# Patient Record
Sex: FEMALE | Race: BLACK OR AFRICAN AMERICAN | NOT HISPANIC OR LATINO | Employment: UNEMPLOYED | URBAN - METROPOLITAN AREA
[De-identification: names, ages, dates, MRNs, and addresses within clinical notes are randomized per-mention and may not be internally consistent; named-entity substitution may affect disease eponyms.]

---

## 2019-01-16 ENCOUNTER — OFFICE VISIT (OUTPATIENT)
Dept: FAMILY MEDICINE | Facility: CLINIC | Age: 34
End: 2019-01-16
Payer: MEDICAID

## 2019-01-16 VITALS
OXYGEN SATURATION: 100 % | HEART RATE: 124 BPM | DIASTOLIC BLOOD PRESSURE: 77 MMHG | TEMPERATURE: 98.9 F | SYSTOLIC BLOOD PRESSURE: 108 MMHG | WEIGHT: 161 LBS

## 2019-01-16 DIAGNOSIS — O99.011 ANEMIA DURING PREGNANCY IN FIRST TRIMESTER: ICD-10-CM

## 2019-01-16 DIAGNOSIS — R30.0 DYSURIA: Primary | ICD-10-CM

## 2019-01-16 DIAGNOSIS — R81 GLUCOSE FOUND IN URINE ON EXAMINATION: ICD-10-CM

## 2019-01-16 DIAGNOSIS — Z32.01 PREGNANCY TEST POSITIVE: ICD-10-CM

## 2019-01-16 LAB
ALBUMIN UR-MCNC: ABNORMAL MG/DL
APPEARANCE UR: CLEAR
BACTERIA #/AREA URNS HPF: ABNORMAL /HPF
BETA HCG QUAL IFA URINE: POSITIVE
BILIRUB UR QL STRIP: ABNORMAL
COLOR UR AUTO: YELLOW
ERYTHROCYTE [DISTWIDTH] IN BLOOD BY AUTOMATED COUNT: 13.5 % (ref 10–15)
GLUCOSE UR STRIP-MCNC: 100 MG/DL
HBA1C MFR BLD: 5 % (ref 0–5.6)
HCT VFR BLD AUTO: 31.5 % (ref 35–47)
HGB BLD-MCNC: 10.6 G/DL (ref 11.7–15.7)
HGB UR QL STRIP: NEGATIVE
KETONES UR STRIP-MCNC: 15 MG/DL
LEUKOCYTE ESTERASE UR QL STRIP: NEGATIVE
MCH RBC QN AUTO: 29.4 PG (ref 26.5–33)
MCHC RBC AUTO-ENTMCNC: 33.7 G/DL (ref 31.5–36.5)
MCV RBC AUTO: 88 FL (ref 78–100)
MUCOUS THREADS #/AREA URNS LPF: PRESENT /LPF
NITRATE UR QL: NEGATIVE
NON-SQ EPI CELLS #/AREA URNS LPF: ABNORMAL /LPF
PH UR STRIP: 5.5 PH (ref 5–7)
PLATELET # BLD AUTO: 152 10E9/L (ref 150–450)
RBC # BLD AUTO: 3.6 10E12/L (ref 3.8–5.2)
RBC #/AREA URNS AUTO: ABNORMAL /HPF
SOURCE: ABNORMAL
SP GR UR STRIP: >1.03 (ref 1–1.03)
SPECIMEN SOURCE: NORMAL
UROBILINOGEN UR STRIP-ACNC: 0.2 EU/DL (ref 0.2–1)
WBC # BLD AUTO: 5.4 10E9/L (ref 4–11)
WBC #/AREA URNS AUTO: ABNORMAL /HPF
WET PREP SPEC: NORMAL

## 2019-01-16 PROCEDURE — 86900 BLOOD TYPING SEROLOGIC ABO: CPT | Performed by: FAMILY MEDICINE

## 2019-01-16 PROCEDURE — 87210 SMEAR WET MOUNT SALINE/INK: CPT | Performed by: FAMILY MEDICINE

## 2019-01-16 PROCEDURE — 86762 RUBELLA ANTIBODY: CPT | Performed by: FAMILY MEDICINE

## 2019-01-16 PROCEDURE — G0499 HEPB SCREEN HIGH RISK INDIV: HCPCS | Performed by: FAMILY MEDICINE

## 2019-01-16 PROCEDURE — 81001 URINALYSIS AUTO W/SCOPE: CPT | Performed by: FAMILY MEDICINE

## 2019-01-16 PROCEDURE — 87591 N.GONORRHOEAE DNA AMP PROB: CPT | Performed by: FAMILY MEDICINE

## 2019-01-16 PROCEDURE — 86850 RBC ANTIBODY SCREEN: CPT | Performed by: FAMILY MEDICINE

## 2019-01-16 PROCEDURE — T1013 SIGN LANG/ORAL INTERPRETER: HCPCS | Mod: U3 | Performed by: FAMILY MEDICINE

## 2019-01-16 PROCEDURE — 86901 BLOOD TYPING SEROLOGIC RH(D): CPT | Performed by: FAMILY MEDICINE

## 2019-01-16 PROCEDURE — 85027 COMPLETE CBC AUTOMATED: CPT | Performed by: FAMILY MEDICINE

## 2019-01-16 PROCEDURE — 87086 URINE CULTURE/COLONY COUNT: CPT | Performed by: FAMILY MEDICINE

## 2019-01-16 PROCEDURE — 87389 HIV-1 AG W/HIV-1&-2 AB AG IA: CPT | Performed by: FAMILY MEDICINE

## 2019-01-16 PROCEDURE — 87491 CHLMYD TRACH DNA AMP PROBE: CPT | Performed by: FAMILY MEDICINE

## 2019-01-16 PROCEDURE — 84703 CHORIONIC GONADOTROPIN ASSAY: CPT | Performed by: FAMILY MEDICINE

## 2019-01-16 PROCEDURE — 80048 BASIC METABOLIC PNL TOTAL CA: CPT | Performed by: FAMILY MEDICINE

## 2019-01-16 PROCEDURE — 83036 HEMOGLOBIN GLYCOSYLATED A1C: CPT | Performed by: FAMILY MEDICINE

## 2019-01-16 PROCEDURE — 36415 COLL VENOUS BLD VENIPUNCTURE: CPT | Performed by: FAMILY MEDICINE

## 2019-01-16 PROCEDURE — 99203 OFFICE O/P NEW LOW 30 MIN: CPT | Performed by: FAMILY MEDICINE

## 2019-01-16 RX ORDER — PNV NO.95/FERROUS FUM/FOLIC AC 28MG-0.8MG
1 TABLET ORAL DAILY
Qty: 100 TABLET | Refills: 3 | Status: ON HOLD | OUTPATIENT
Start: 2019-01-16 | End: 2019-05-30

## 2019-01-16 SDOH — HEALTH STABILITY: MENTAL HEALTH: HOW OFTEN DO YOU HAVE A DRINK CONTAINING ALCOHOL?: NEVER

## 2019-01-16 NOTE — PROGRESS NOTES
SUBJECTIVE:  Caleb Talley is a 34 year old female who presents pregnancy.   Her LMP was 9/15/18.   Her periods are about every 4 weeks.   She has never been pregnant before.   She is having some nausea and some vomiting.   She is not on any prenatal vitamins.       Obstetric History       T0      L0     SAB0   TAB0   Ectopic0   Multiple0   Live Births0       # Outcome Date GA Lbr Ben/2nd Weight Sex Delivery Anes PTL Lv   1 Current                     History reviewed. No pertinent past medical history.    Past Surgical History:   Procedure Laterality Date     NO HISTORY OF SURGERY         MEDICATIONS:  Current Outpatient Medications   Medication     Prenatal Vit-Fe Fumarate-FA (PRENATAL VITAMIN AND MINERAL) 28-0.8 MG TABS     No current facility-administered medications for this visit.        SOCIAL HISTORY:  Social History     Tobacco Use     Smoking status: Never Smoker     Smokeless tobacco: Never Used   Substance Use Topics     Alcohol use: No     Frequency: Never       History reviewed. No pertinent family history.    Objective:  Blood pressure 108/77, pulse 124, temperature 98.9  F (37.2  C), temperature source Oral, weight 73 kg (161 lb), last menstrual period 09/15/2018, SpO2 100 %.  Chest: Clear to auscultation bilaterally.  No wheezes, rales or retractions.  CV: Regular rate and rhythm without murmurs, rubs or gallops.  FH: 18 cm  FHR: 160 bpm    UPT: +++    Assessment:  1. Primigravida at 17 weeks and 3 days by LMP  2. Some nausea    Plan:  1. Will get first OB labs  2. Patient does need pap - will schedule for 2 weeks  3. Ordered ultrasound for dating and fetal survey  4. Will discuss quad screen at next visit  5. Ordered prenatal vitamins for daily intake

## 2019-01-17 ENCOUNTER — HOSPITAL ENCOUNTER (OUTPATIENT)
Dept: ULTRASOUND IMAGING | Facility: CLINIC | Age: 34
Discharge: HOME | End: 2019-01-17
Attending: FAMILY MEDICINE | Admitting: FAMILY MEDICINE
Payer: MEDICAID

## 2019-01-17 DIAGNOSIS — Z32.01 PREGNANCY TEST POSITIVE: ICD-10-CM

## 2019-01-17 LAB
ABO + RH BLD: NORMAL
ABO + RH BLD: NORMAL
ANION GAP SERPL CALCULATED.3IONS-SCNC: 8 MMOL/L (ref 3–14)
BACTERIA SPEC CULT: NORMAL
BLD GP AB SCN SERPL QL: NORMAL
BLOOD BANK CMNT PATIENT-IMP: NORMAL
BUN SERPL-MCNC: 5 MG/DL (ref 7–30)
C TRACH DNA SPEC QL NAA+PROBE: NEGATIVE
CALCIUM SERPL-MCNC: 9.1 MG/DL (ref 8.5–10.1)
CHLORIDE SERPL-SCNC: 105 MMOL/L (ref 94–109)
CO2 SERPL-SCNC: 23 MMOL/L (ref 20–32)
CREAT SERPL-MCNC: 0.46 MG/DL (ref 0.52–1.04)
GFR SERPL CREATININE-BSD FRML MDRD: >90 ML/MIN/{1.73_M2}
GLUCOSE SERPL-MCNC: 155 MG/DL (ref 70–99)
N GONORRHOEA DNA SPEC QL NAA+PROBE: NEGATIVE
POTASSIUM SERPL-SCNC: 3.6 MMOL/L (ref 3.4–5.3)
RUBV IGG SERPL IA-ACNC: 4 IU/ML
SODIUM SERPL-SCNC: 136 MMOL/L (ref 133–144)
SPECIMEN EXP DATE BLD: NORMAL
SPECIMEN SOURCE: NORMAL

## 2019-01-17 PROCEDURE — T1013 SIGN LANG/ORAL INTERPRETER: HCPCS | Mod: U3

## 2019-01-17 PROCEDURE — 76805 OB US >/= 14 WKS SNGL FETUS: CPT

## 2019-01-18 ENCOUNTER — TELEPHONE (OUTPATIENT)
Dept: FAMILY MEDICINE | Facility: CLINIC | Age: 34
End: 2019-01-18

## 2019-01-18 DIAGNOSIS — R73.09 ELEVATED GLUCOSE LEVEL: Primary | ICD-10-CM

## 2019-01-18 PROBLEM — Z28.39 RUBELLA NON-IMMUNE STATUS, ANTEPARTUM: Status: ACTIVE | Noted: 2019-01-18

## 2019-01-18 PROBLEM — O09.899 RUBELLA NON-IMMUNE STATUS, ANTEPARTUM: Status: ACTIVE | Noted: 2019-01-18

## 2019-01-18 PROBLEM — O99.011 ANEMIA DURING PREGNANCY IN FIRST TRIMESTER: Status: ACTIVE | Noted: 2019-01-18

## 2019-01-18 PROBLEM — Z67.10 BLOOD TYPE A+: Status: ACTIVE | Noted: 2019-01-18

## 2019-01-18 LAB
HBV SURFACE AG SERPL QL IA: NONREACTIVE
HIV 1+2 AB+HIV1 P24 AG SERPL QL IA: NONREACTIVE

## 2019-01-18 RX ORDER — FERROUS GLUCONATE 324(38)MG
324 TABLET ORAL 2 TIMES DAILY WITH MEALS
Qty: 100 TABLET | Refills: 6 | Status: SHIPPED | OUTPATIENT
Start: 2019-01-18 | End: 2019-03-20

## 2019-01-18 NOTE — TELEPHONE ENCOUNTER
Pt informed via   [id number].   Agrees to plan.   Fasting (drink water during your fast) 3 hr GTT 1/23/19.  Lab staff informed.   Joe Simpson RN

## 2019-01-18 NOTE — TELEPHONE ENCOUNTER
Mild anemia - sent iron to our pharmacy.   NOT immune to rubella.   Will need vaccination after delivery but in meantime avoid people who are sick and have not been vaccinated with the MMR vaccine. Also blood sugar a little high.   Will need to have diabetes screen.   Could do as lab only too.   I would prefer to 3 hour GTT.

## 2019-01-18 NOTE — TELEPHONE ENCOUNTER
Created episode.  Date in log book updated.  Not able to get to dating to enter U/S date and ANDREIA.  Raquel Park RN

## 2019-01-23 DIAGNOSIS — R73.09 ELEVATED GLUCOSE LEVEL: ICD-10-CM

## 2019-01-23 PROCEDURE — 36415 COLL VENOUS BLD VENIPUNCTURE: CPT | Performed by: FAMILY MEDICINE

## 2019-01-23 PROCEDURE — 82951 GLUCOSE TOLERANCE TEST (GTT): CPT | Performed by: FAMILY MEDICINE

## 2019-01-23 PROCEDURE — 82952 GTT-ADDED SAMPLES: CPT | Performed by: FAMILY MEDICINE

## 2019-01-24 LAB
GLUCOSE 1H P 100 G GLC PO SERPL-MCNC: 159 MG/DL (ref 60–179)
GLUCOSE 2H P 100 G GLC PO SERPL-MCNC: 100 MG/DL (ref 60–154)
GLUCOSE 3H P 100 G GLC PO SERPL-MCNC: 81 MG/DL (ref 60–139)
GLUCOSE P FAST SERPL-MCNC: 82 MG/DL (ref 60–94)

## 2019-02-06 ENCOUNTER — PRENATAL OFFICE VISIT (OUTPATIENT)
Dept: FAMILY MEDICINE | Facility: CLINIC | Age: 34
End: 2019-02-06
Payer: MEDICAID

## 2019-02-06 VITALS — TEMPERATURE: 98.1 F | HEART RATE: 137 BPM | WEIGHT: 163 LBS

## 2019-02-06 DIAGNOSIS — K21.9 GASTROESOPHAGEAL REFLUX DISEASE, ESOPHAGITIS PRESENCE NOT SPECIFIED: Primary | ICD-10-CM

## 2019-02-06 DIAGNOSIS — Z34.92 PRENATAL CARE IN SECOND TRIMESTER: ICD-10-CM

## 2019-02-06 PROCEDURE — G0145 SCR C/V CYTO,THINLAYER,RESCR: HCPCS | Performed by: FAMILY MEDICINE

## 2019-02-06 PROCEDURE — T1013 SIGN LANG/ORAL INTERPRETER: HCPCS | Mod: U3 | Performed by: FAMILY MEDICINE

## 2019-02-06 PROCEDURE — 99207 ZZC FIRST OB VISIT: CPT | Performed by: FAMILY MEDICINE

## 2019-02-06 PROCEDURE — 87624 HPV HI-RISK TYP POOLED RSLT: CPT | Performed by: FAMILY MEDICINE

## 2019-02-06 PROCEDURE — G0476 HPV COMBO ASSAY CA SCREEN: HCPCS | Performed by: FAMILY MEDICINE

## 2019-02-06 NOTE — LETTER
February 14, 2019    Caleb Talley  [id number] [address]    Dear ,  This letter is regarding your recent Pap smear (cervical cancer screening) and Human Papillomavirus (HPV) test.  We are happy to inform you that your Pap smear result is normal. Cervical cancer is closely linked with certain types of HPV. Your results showed no evidence of high-risk HPV.  Therefore we recommend you return in 5 years for your next pap smear and HPV test.  You will still need to return to the clinic every year for an annual exam and other preventive tests.  If you have additional questions regarding this result, please call our registered nurse, Zarina at [phone].  Sincerely,    Rin Hoffmann MD/[facility]

## 2019-02-06 NOTE — PROGRESS NOTES
Subjective:  Caleb Talley is a 34 year old female  who presents today for her first prenatal visit.  She has never had an abnormal PAP smear.  Her LMP was uncertain and mid september.  She has had trouble with nausea.  This is her 1st pregnancy.  She is a G 1 P 0.    Her EDC is 5/31/2019.  She has the following questions:  none.   She is feeling the baby move for the last 4 weeks.       Current Outpatient Medications   Medication Sig Dispense Refill     ferrous gluconate (FERGON) 324 (38 Fe) MG tablet Take 1 tablet (324 mg) by mouth 2 times daily (with meals) 100 tablet 6     Prenatal Vit-Fe Fumarate-FA (PRENATAL VITAMIN AND MINERAL) 28-0.8 MG TABS Take 1 tablet by mouth daily 100 tablet 3       Past Medical History:   Diagnosis Date     Blood type A+ 1/18/2019     Rubella non-immune status, antepartum 1/18/2019       Past Surgical History:   Procedure Laterality Date     NO HISTORY OF SURGERY         History reviewed. No pertinent family history.    Social History     Tobacco Use     Smoking status: Never Smoker     Smokeless tobacco: Never Used   Substance Use Topics     Alcohol use: No     Frequency: Never       Objective:  Blood pressure 114/78, pulse 137, temperature 98.1  F (36.7  C), temperature source Oral, weight 73.9 kg (163 lb), last menstrual period 09/15/2018.  General appearance: Healthy.  Mental Status: cooperative, normal affect, no gross thought process defects.  HEENT:   Ears- Normal external canals and TMs  Eyes- PERRL, EOM's intact, fundi benign, sharp disc margins.  Throat- Normal  Nodes- Negative  Thyroid: Normal to palpation, no enlargement or nodules noted.  Breasts: Symmetric without mass tenderness or discharge.  Axillary nodes negative.  Lungs: Clear to auscultation bilaterally  Heart.: Normal rate and rhythm.  No murmurs, clicks or gallops.  Pulses:    R-4/4, PT-4/4, DP-4/4  Abdomen: BS active. Soft, non-tender, no masses or organomegaly.  Aorta normal. No bruits.  Genitalia: Normal female  external genitalia without lesions.  Speculum:  Cervix normal,  Pap taken, bimanual exam  22 week size uterus, no adenexal mass or tenderness.  Doptone 140-150.  Extremities: Normal without edema  Reflexes:  Symmetric bilaterally and 2 + at the patella  Skin: Clear and free of rash.    Assessment:  1. Caleb Tallye is a healthy 34 year old female here for a first prenatal visit.      Plan:  1. A Pap smear was obtained  2. Prenatal labs ordered - see epicare orders  3. Patient is to follow-up in 4 weeks and as needed.

## 2019-02-08 LAB
COPATH REPORT: NORMAL
PAP: NORMAL

## 2019-02-12 LAB
FINAL DIAGNOSIS: NORMAL
HPV HR 12 DNA CVX QL NAA+PROBE: NEGATIVE
HPV16 DNA SPEC QL NAA+PROBE: NEGATIVE
HPV18 DNA SPEC QL NAA+PROBE: NEGATIVE
SPECIMEN DESCRIPTION: NORMAL
SPECIMEN SOURCE CVX/VAG CYTO: NORMAL

## 2019-03-06 ENCOUNTER — PRENATAL OFFICE VISIT (OUTPATIENT)
Dept: FAMILY MEDICINE | Facility: CLINIC | Age: 34
End: 2019-03-06
Payer: COMMERCIAL

## 2019-03-06 VITALS
HEART RATE: 130 BPM | SYSTOLIC BLOOD PRESSURE: 110 MMHG | DIASTOLIC BLOOD PRESSURE: 70 MMHG | OXYGEN SATURATION: 99 % | RESPIRATION RATE: 17 BRPM | TEMPERATURE: 97.8 F | WEIGHT: 162 LBS

## 2019-03-06 DIAGNOSIS — Z23 ENCOUNTER FOR IMMUNIZATION: ICD-10-CM

## 2019-03-06 DIAGNOSIS — Z34.92 PRENATAL CARE IN SECOND TRIMESTER: ICD-10-CM

## 2019-03-06 DIAGNOSIS — O21.9 NAUSEA AND VOMITING DURING PREGNANCY: Primary | ICD-10-CM

## 2019-03-06 PROCEDURE — 90471 IMMUNIZATION ADMIN: CPT | Performed by: FAMILY MEDICINE

## 2019-03-06 PROCEDURE — 99207 ZZC PRENATAL VISIT: CPT | Mod: 25 | Performed by: FAMILY MEDICINE

## 2019-03-06 PROCEDURE — 90715 TDAP VACCINE 7 YRS/> IM: CPT | Performed by: FAMILY MEDICINE

## 2019-03-06 RX ORDER — ONDANSETRON 4 MG/1
4 TABLET, FILM COATED ORAL EVERY 8 HOURS PRN
Qty: 30 TABLET | Refills: 1 | Status: SHIPPED | OUTPATIENT
Start: 2019-03-06 | End: 2022-07-26

## 2019-03-06 NOTE — NURSING NOTE
Screening Questionnaire for Adult Immunization    Are you sick today?   No   Do you have allergies to medications, food, a vaccine component or latex?   No   Have you ever had a serious reaction after receiving a vaccination?   No   Do you have a long-term health problem with heart disease, lung disease, asthma, kidney disease, metabolic disease (e.g. diabetes), anemia, or other blood disorder?   No   Do you have cancer, leukemia, HIV/AIDS, or any other immune system problem?   No   In the past 3 months, have you taken medications that affect  your immune system, such as prednisone, other steroids, or anticancer drugs; drugs for the treatment of rheumatoid arthritis, Crohn s disease, or psoriasis; or have you had radiation treatments?   No   Have you had a seizure, or a brain or other nervous system problem?   No   During the past year, have you received a transfusion of blood or blood     products, or been given immune (gamma) globulin or antiviral drug?   No   For women: Are you pregnant or is there a chance you could become        pregnant during the next month?   No   Have you received any vaccinations in the past 4 weeks?   No     Patient instructed to remain in clinic for 20 minutes afterwards, and to report any adverse reaction to me immediately.       Screening performed by aYdy Alcocer CMA    Prior to injection verified patient identity using patient's name and date of birth.  Per orders of Dr. Spear , injection of Tdap given by Yady Alcocer CMA. Patient instructed to remain in clinic for 20 minutes afterwards, and to report any adverse reaction to me immediately.    Vaccine information supplied.

## 2019-03-06 NOTE — PROGRESS NOTES
Still with nausea and vomiting almost every time she eats - will send over zofran for this  tdap today  Rubella NOT IMMUNE  Will check HGB at 36 weeks  Cervix check next visit  Feeling fetal movement  BOY baby

## 2019-03-11 ENCOUNTER — TELEPHONE (OUTPATIENT)
Dept: FAMILY MEDICINE | Facility: CLINIC | Age: 34
End: 2019-03-11

## 2019-03-11 NOTE — TELEPHONE ENCOUNTER
Agree with all advice plus could use OTC colace stool softener and miralax 1-2 tbsp per day.   If this is not working should come in

## 2019-03-11 NOTE — TELEPHONE ENCOUNTER
Pt calling into clinic  Pt did not understand some of the words I used, explained several times using different words for constipation, stool, fiber foods, etc  C/o constipation, thinks her last stool was about a week ago  Very uncomfortable, difficult to sleep, has urge to go but not able  advised high fiber foods, fresh fruits & vegetables, increased water, hot coffee and tea in am, stool softeners like Citrucel, Metamucil, mineral oil      # [phone]    Pt is pregnant, not sure what are best options for constipation while pregnant    Route to provider to review and advise    Griselda Mclaughlin RN Nurse Triage

## 2019-03-20 ENCOUNTER — PRENATAL OFFICE VISIT (OUTPATIENT)
Dept: FAMILY MEDICINE | Facility: CLINIC | Age: 34
End: 2019-03-20
Payer: COMMERCIAL

## 2019-03-20 VITALS
DIASTOLIC BLOOD PRESSURE: 66 MMHG | TEMPERATURE: 97.6 F | HEART RATE: 124 BPM | WEIGHT: 163.2 LBS | RESPIRATION RATE: 18 BRPM | OXYGEN SATURATION: 99 % | SYSTOLIC BLOOD PRESSURE: 102 MMHG | BODY MASS INDEX: 32.9 KG/M2 | HEIGHT: 59 IN

## 2019-03-20 DIAGNOSIS — O09.899 RUBELLA NON-IMMUNE STATUS, ANTEPARTUM: ICD-10-CM

## 2019-03-20 DIAGNOSIS — Z28.39 RUBELLA NON-IMMUNE STATUS, ANTEPARTUM: ICD-10-CM

## 2019-03-20 DIAGNOSIS — Z34.92 PRENATAL CARE IN SECOND TRIMESTER: Primary | ICD-10-CM

## 2019-03-20 DIAGNOSIS — O99.011 ANEMIA DURING PREGNANCY IN FIRST TRIMESTER: ICD-10-CM

## 2019-03-20 LAB — HGB BLD-MCNC: 10.5 G/DL (ref 11.7–15.7)

## 2019-03-20 PROCEDURE — 00000218 ZZHCL STATISTIC OBHBG - HEMOGLOBIN: Performed by: FAMILY MEDICINE

## 2019-03-20 PROCEDURE — 36415 COLL VENOUS BLD VENIPUNCTURE: CPT | Performed by: FAMILY MEDICINE

## 2019-03-20 PROCEDURE — 99207 ZZC PRENATAL VISIT: CPT | Performed by: FAMILY MEDICINE

## 2019-03-20 RX ORDER — FERROUS GLUCONATE 324(38)MG
324 TABLET ORAL
Qty: 180 TABLET | Refills: 1 | Status: ON HOLD | OUTPATIENT
Start: 2019-03-20 | End: 2019-05-30

## 2019-03-20 ASSESSMENT — MIFFLIN-ST. JEOR: SCORE: 1337.96

## 2019-03-20 NOTE — PROGRESS NOTES
She is doing well  She does not need the medication anymore for her nausea.     She is feeling the baby move.  OBHGB today   tdap done today   Will check cervix at next visit  Rubella NON immune - will vaccinate after delivery   BOY baby

## 2019-04-10 ENCOUNTER — PRENATAL OFFICE VISIT (OUTPATIENT)
Dept: FAMILY MEDICINE | Facility: CLINIC | Age: 34
End: 2019-04-10
Payer: COMMERCIAL

## 2019-04-10 VITALS
DIASTOLIC BLOOD PRESSURE: 74 MMHG | SYSTOLIC BLOOD PRESSURE: 112 MMHG | OXYGEN SATURATION: 97 % | RESPIRATION RATE: 18 BRPM | HEART RATE: 63 BPM | BODY MASS INDEX: 33.69 KG/M2 | WEIGHT: 164 LBS | TEMPERATURE: 98 F

## 2019-04-10 DIAGNOSIS — Z34.93 PRENATAL CARE IN THIRD TRIMESTER: ICD-10-CM

## 2019-04-10 PROCEDURE — 99207 ZZC PRENATAL VISIT: CPT | Performed by: FAMILY MEDICINE

## 2019-04-10 NOTE — PROGRESS NOTES
Rubella NON immune  Baby BOY  Doing well with no concerns  On iron for mild anemia of 10.5  Passed 3 hour GTT and had tdap

## 2019-05-13 ENCOUNTER — PRENATAL OFFICE VISIT (OUTPATIENT)
Dept: FAMILY MEDICINE | Facility: CLINIC | Age: 34
End: 2019-05-13
Payer: COMMERCIAL

## 2019-05-13 VITALS
BODY MASS INDEX: 34.1 KG/M2 | TEMPERATURE: 97.6 F | WEIGHT: 166 LBS | HEART RATE: 106 BPM | OXYGEN SATURATION: 100 % | DIASTOLIC BLOOD PRESSURE: 66 MMHG | SYSTOLIC BLOOD PRESSURE: 106 MMHG

## 2019-05-13 DIAGNOSIS — Z34.93 PRENATAL CARE IN THIRD TRIMESTER: Primary | ICD-10-CM

## 2019-05-13 LAB — HGB BLD-MCNC: 11.9 G/DL (ref 11.7–15.7)

## 2019-05-13 PROCEDURE — 36415 COLL VENOUS BLD VENIPUNCTURE: CPT | Performed by: FAMILY MEDICINE

## 2019-05-13 PROCEDURE — 00000218 ZZHCL STATISTIC OBHBG - HEMOGLOBIN: Performed by: FAMILY MEDICINE

## 2019-05-13 PROCEDURE — 87653 STREP B DNA AMP PROBE: CPT | Performed by: FAMILY MEDICINE

## 2019-05-13 PROCEDURE — 86780 TREPONEMA PALLIDUM: CPT | Performed by: FAMILY MEDICINE

## 2019-05-13 PROCEDURE — 99207 ZZC PRENATAL VISIT: CPT | Performed by: FAMILY MEDICINE

## 2019-05-13 NOTE — PROGRESS NOTES
Baby is moving  Some contractions  No concerns  Baby is cephalic  Collected GBS today  Cervix check difficult due to patient discomfort

## 2019-05-14 LAB
GP B STREP DNA SPEC QL NAA+PROBE: NEGATIVE
SPECIMEN SOURCE: NORMAL
T PALLIDUM AB SER QL: NONREACTIVE

## 2019-05-22 ENCOUNTER — PRENATAL OFFICE VISIT (OUTPATIENT)
Dept: FAMILY MEDICINE | Facility: CLINIC | Age: 34
End: 2019-05-22
Payer: COMMERCIAL

## 2019-05-22 VITALS
HEART RATE: 100 BPM | RESPIRATION RATE: 16 BRPM | WEIGHT: 166 LBS | DIASTOLIC BLOOD PRESSURE: 80 MMHG | BODY MASS INDEX: 34.1 KG/M2 | OXYGEN SATURATION: 100 % | TEMPERATURE: 98.3 F | SYSTOLIC BLOOD PRESSURE: 124 MMHG

## 2019-05-22 DIAGNOSIS — Z34.93 PRENATAL CARE IN THIRD TRIMESTER: Primary | ICD-10-CM

## 2019-05-22 PROCEDURE — 99207 ZZC PRENATAL VISIT: CPT | Performed by: FAMILY MEDICINE

## 2019-05-22 NOTE — PROGRESS NOTES
Some contractions  GBS NEG  HGB 11.9  BOY baby  Difficult to get good vaginal exam and cervical exam due to patients inability to cooperate due to discomfort - babies head is low and cervix feels soft and thinner than last week

## 2019-05-23 ENCOUNTER — TELEPHONE (OUTPATIENT)
Dept: FAMILY MEDICINE | Facility: CLINIC | Age: 34
End: 2019-05-23

## 2019-05-23 NOTE — TELEPHONE ENCOUNTER
Reason for call:  Reason for call:  Patient reporting a symptom    Symptom or request: 9mo pregnant and spotting.    Duration (how long have symptoms been present): last couple days    Have you been treated for this before? No    Additional comments:     Phone Number patient can be reached at:  Cell number on file:    Telephone Information:   Mobile [phone]       Best Time:      Can we leave a detailed message on this number:  YES    Call taken on 5/23/2019 at 8:09 AM by Salomón Flor

## 2019-05-23 NOTE — TELEPHONE ENCOUNTER
Called patient back.  Spotting this am.  A little bit on toilet paper.  Discussed spotting can be caused by exam by provider which she had yesterday.  Discussed she monitor should resolve in 2 days or so and to call if increased pain or increased bleeding.  Patient agrees with plan.  Raquel Park RN

## 2019-05-27 ENCOUNTER — HOSPITAL ENCOUNTER (OUTPATIENT)
Facility: CLINIC | Age: 34
Discharge: HOME | End: 2019-05-27
Attending: FAMILY MEDICINE | Admitting: FAMILY MEDICINE
Payer: COMMERCIAL

## 2019-05-27 ENCOUNTER — HOSPITAL ENCOUNTER (INPATIENT)
Facility: CLINIC | Age: 34
LOS: 2 days | Discharge: HOME | End: 2019-05-30
Attending: FAMILY MEDICINE | Admitting: OBSTETRICS & GYNECOLOGY
Payer: COMMERCIAL

## 2019-05-27 VITALS
DIASTOLIC BLOOD PRESSURE: 74 MMHG | SYSTOLIC BLOOD PRESSURE: 113 MMHG | TEMPERATURE: 98.1 F | HEART RATE: 140 BPM | RESPIRATION RATE: 18 BRPM | OXYGEN SATURATION: 99 %

## 2019-05-27 DIAGNOSIS — O99.011 ANEMIA DURING PREGNANCY IN FIRST TRIMESTER: ICD-10-CM

## 2019-05-27 LAB
ALBUMIN UR-MCNC: 70 MG/DL
APPEARANCE UR: ABNORMAL
BACTERIA #/AREA URNS HPF: ABNORMAL /HPF
BILIRUB UR QL STRIP: NEGATIVE
COLOR UR AUTO: YELLOW
GLUCOSE UR STRIP-MCNC: 30 MG/DL
HGB UR QL STRIP: ABNORMAL
KETONES UR STRIP-MCNC: 10 MG/DL
LEUKOCYTE ESTERASE UR QL STRIP: ABNORMAL
MUCOUS THREADS #/AREA URNS LPF: PRESENT /LPF
NITRATE UR QL: NEGATIVE
PH UR STRIP: 6 PH (ref 5–7)
RBC #/AREA URNS AUTO: 5 /HPF (ref 0–2)
SOURCE: ABNORMAL
SP GR UR STRIP: 1.02 (ref 1–1.03)
SQUAMOUS #/AREA URNS AUTO: 13 /HPF (ref 0–1)
TRANS CELLS #/AREA URNS HPF: 1 /HPF (ref 0–1)
UROBILINOGEN UR STRIP-MCNC: NORMAL MG/DL (ref 0–2)
WBC #/AREA URNS AUTO: 26 /HPF (ref 0–5)

## 2019-05-27 PROCEDURE — G0463 HOSPITAL OUTPT CLINIC VISIT: HCPCS

## 2019-05-27 PROCEDURE — 59025 FETAL NON-STRESS TEST: CPT | Mod: 26 | Performed by: OBSTETRICS & GYNECOLOGY

## 2019-05-27 PROCEDURE — 81001 URINALYSIS AUTO W/SCOPE: CPT | Performed by: FAMILY MEDICINE

## 2019-05-27 PROCEDURE — 59025 FETAL NON-STRESS TEST: CPT

## 2019-05-27 NOTE — PROVIDER NOTIFICATION
05/27/19 1045   Provider Notification   Provider Name/Title Dr RAMOS Spear    Method of Notification Phone   Request Evaluate - Remote   Notification Reason Patient Arrived;Membrane Status;Labor Status;Uterine Activity;Maternal Vital Sign Change;Status Update;SVE    Dr Spear updated with patients arrival, SVE of of 1.5/80/-1, contraction pattern, FHR, elevated maternal pulse. Orders received to walk the patient and recheck in a couple hours.

## 2019-05-27 NOTE — PROVIDER NOTIFICATION
05/27/19 1111   Provider Notification   Provider Name/Title Dr Spear    Method of Notification Phone   Request Evaluate - Remote   Notification Reason Status Update   Updated Dr Spear with patients request to go home and labor there until things get worse.  is ok with that plan.

## 2019-05-27 NOTE — DISCHARGE INSTRUCTIONS
Discharge Instruction for Undelivered Patients      You were seen for: Labor Assessment  We Consulted: Dr Spear  You had (Test or Medicine): non stress test     Diet:   Drink 8 to 12 glasses of liquids (milk, juice, water) every day.  You may eat meals and snacks.     Activity:  Call your doctor or nurse midwife if your baby is moving less than usual.     Call your provider if you notice:  Swelling in your face or increased swelling in your hands or legs.  Headaches that are not relieved by Tylenol (acetaminophen).  Changes in your vision (blurring: seeing spots or stars.)  Nausea (sick to your stomach) and vomiting (throwing up).   Weight gain of 5 pounds or more per week.  Heartburn that doesn't go away.    Signs of bladder infection: pain when you urinate (use the toilet), need to go more often and more urgently.  The bag of jones (rupture of membranes) breaks, or you notice leaking in your underwear.  Bright red blood in your underwear.  Abdominal (lower belly) or stomach pain.  For first baby: Contractions (tightening) less than 5 minutes apart for one hour or more.    Increase or change in vaginal discharge (note the color and amount)  Other: Call and come back if the contractions get worse.     Follow-up:  As scheduled in the clinic

## 2019-05-27 NOTE — PLAN OF CARE
Data: Patient presented to [facility] at 2019  9:49 AM.  Reason for maternal/fetal assessment is uterine contractions, vaginal bleeding, abdominal pain. Patient reports contractions and a little bloody show .  Patient is a .  Prenatal record reviewed. Pregnancy has been has been uncomplicated. thus far.  Gestational Age 39w3d. VSS. Fetal movement present. Patient denies leaking of vaginal fluid/rupture of membranes, pelvic pressure, nausea, vomiting, headache, visual disturbances, epigastric or URQ pain, significant edema. Support person is present.   Action: Verbal consent for EFM. Triage assessment completed. Bill of rights reviewed.  Response: Patient verbalized agreement with plan. Will contact Dr Rin Huitron with update and further orders.

## 2019-05-27 NOTE — PLAN OF CARE
Patient requested to go home and walk, she said she will come back when it gets worse.     Paged Dr Spear.

## 2019-05-28 ENCOUNTER — ANESTHESIA (OUTPATIENT)
Dept: OBGYN | Facility: CLINIC | Age: 34
End: 2019-05-28

## 2019-05-28 ENCOUNTER — TELEPHONE (OUTPATIENT)
Dept: FAMILY MEDICINE | Facility: CLINIC | Age: 34
End: 2019-05-28

## 2019-05-28 ENCOUNTER — ANESTHESIA EVENT (OUTPATIENT)
Dept: OBGYN | Facility: CLINIC | Age: 34
End: 2019-05-28

## 2019-05-28 LAB
ABO + RH BLD: NORMAL
ABO + RH BLD: NORMAL
AMPHETAMINES UR QL SCN: NEGATIVE
CANNABINOIDS UR QL: NEGATIVE
COCAINE UR QL: NEGATIVE
OPIATES UR QL SCN: NEGATIVE
PCP UR QL SCN: NEGATIVE
SPECIMEN EXP DATE BLD: NORMAL
T PALLIDUM AB SER QL: NONREACTIVE

## 2019-05-28 PROCEDURE — 25000125 ZZHC RX 250: Performed by: OBSTETRICS & GYNECOLOGY

## 2019-05-28 PROCEDURE — 12000000 ZZH R&B MED SURG/OB

## 2019-05-28 PROCEDURE — 25800030 ZZH RX IP 258 OP 636: Performed by: OBSTETRICS & GYNECOLOGY

## 2019-05-28 PROCEDURE — 86780 TREPONEMA PALLIDUM: CPT | Performed by: OBSTETRICS & GYNECOLOGY

## 2019-05-28 PROCEDURE — 25000132 ZZH RX MED GY IP 250 OP 250 PS 637: Performed by: OBSTETRICS & GYNECOLOGY

## 2019-05-28 PROCEDURE — 86901 BLOOD TYPING SEROLOGIC RH(D): CPT | Performed by: OBSTETRICS & GYNECOLOGY

## 2019-05-28 PROCEDURE — 72200001 ZZH LABOR CARE VAGINAL DELIVERY SINGLE

## 2019-05-28 PROCEDURE — 80307 DRUG TEST PRSMV CHEM ANLYZR: CPT | Performed by: OBSTETRICS & GYNECOLOGY

## 2019-05-28 PROCEDURE — 86900 BLOOD TYPING SEROLOGIC ABO: CPT | Performed by: OBSTETRICS & GYNECOLOGY

## 2019-05-28 RX ORDER — IBUPROFEN 800 MG/1
800 TABLET, FILM COATED ORAL EVERY 6 HOURS PRN
Status: DISCONTINUED | OUTPATIENT
Start: 2019-05-28 | End: 2019-05-30 | Stop reason: HOSPADM

## 2019-05-28 RX ORDER — ACETAMINOPHEN 325 MG/1
650 TABLET ORAL EVERY 4 HOURS PRN
Status: DISCONTINUED | OUTPATIENT
Start: 2019-05-28 | End: 2019-05-28

## 2019-05-28 RX ORDER — LANOLIN 100 %
OINTMENT (GRAM) TOPICAL
Status: DISCONTINUED | OUTPATIENT
Start: 2019-05-28 | End: 2019-05-30 | Stop reason: HOSPADM

## 2019-05-28 RX ORDER — AMOXICILLIN 250 MG
1 CAPSULE ORAL 2 TIMES DAILY
Status: DISCONTINUED | OUTPATIENT
Start: 2019-05-28 | End: 2019-05-30 | Stop reason: HOSPADM

## 2019-05-28 RX ORDER — SODIUM CHLORIDE, SODIUM LACTATE, POTASSIUM CHLORIDE, CALCIUM CHLORIDE 600; 310; 30; 20 MG/100ML; MG/100ML; MG/100ML; MG/100ML
INJECTION, SOLUTION INTRAVENOUS CONTINUOUS
Status: DISCONTINUED | OUTPATIENT
Start: 2019-05-28 | End: 2019-05-28

## 2019-05-28 RX ORDER — NALOXONE HYDROCHLORIDE 0.4 MG/ML
.1-.4 INJECTION, SOLUTION INTRAMUSCULAR; INTRAVENOUS; SUBCUTANEOUS
Status: DISCONTINUED | OUTPATIENT
Start: 2019-05-28 | End: 2019-05-30 | Stop reason: HOSPADM

## 2019-05-28 RX ORDER — AMOXICILLIN 250 MG
2 CAPSULE ORAL 2 TIMES DAILY
Status: DISCONTINUED | OUTPATIENT
Start: 2019-05-28 | End: 2019-05-30 | Stop reason: HOSPADM

## 2019-05-28 RX ORDER — OXYTOCIN/0.9 % SODIUM CHLORIDE 30/500 ML
100 PLASTIC BAG, INJECTION (ML) INTRAVENOUS CONTINUOUS
Status: DISCONTINUED | OUTPATIENT
Start: 2019-05-28 | End: 2019-05-30 | Stop reason: HOSPADM

## 2019-05-28 RX ORDER — CARBOPROST TROMETHAMINE 250 UG/ML
250 INJECTION, SOLUTION INTRAMUSCULAR
Status: DISCONTINUED | OUTPATIENT
Start: 2019-05-28 | End: 2019-05-28

## 2019-05-28 RX ORDER — HYDROCORTISONE 2.5 %
CREAM (GRAM) TOPICAL 3 TIMES DAILY PRN
Status: DISCONTINUED | OUTPATIENT
Start: 2019-05-28 | End: 2019-05-30 | Stop reason: HOSPADM

## 2019-05-28 RX ORDER — METHYLERGONOVINE MALEATE 0.2 MG/ML
200 INJECTION INTRAVENOUS
Status: DISCONTINUED | OUTPATIENT
Start: 2019-05-28 | End: 2019-05-28

## 2019-05-28 RX ORDER — PRENATAL VIT/IRON FUM/FOLIC AC 27MG-0.8MG
1 TABLET ORAL DAILY
Status: DISCONTINUED | OUTPATIENT
Start: 2019-05-28 | End: 2019-05-30 | Stop reason: HOSPADM

## 2019-05-28 RX ORDER — OXYTOCIN/0.9 % SODIUM CHLORIDE 30/500 ML
340 PLASTIC BAG, INJECTION (ML) INTRAVENOUS CONTINUOUS PRN
Status: DISCONTINUED | OUTPATIENT
Start: 2019-05-28 | End: 2019-05-30 | Stop reason: HOSPADM

## 2019-05-28 RX ORDER — FENTANYL CITRATE 50 UG/ML
50-100 INJECTION, SOLUTION INTRAMUSCULAR; INTRAVENOUS
Status: DISCONTINUED | OUTPATIENT
Start: 2019-05-28 | End: 2019-05-28

## 2019-05-28 RX ORDER — NALOXONE HYDROCHLORIDE 0.4 MG/ML
.1-.4 INJECTION, SOLUTION INTRAMUSCULAR; INTRAVENOUS; SUBCUTANEOUS
Status: DISCONTINUED | OUTPATIENT
Start: 2019-05-28 | End: 2019-05-28

## 2019-05-28 RX ORDER — ACETAMINOPHEN 325 MG/1
650 TABLET ORAL EVERY 4 HOURS PRN
Status: DISCONTINUED | OUTPATIENT
Start: 2019-05-28 | End: 2019-05-30 | Stop reason: HOSPADM

## 2019-05-28 RX ORDER — OXYTOCIN 10 [USP'U]/ML
10 INJECTION, SOLUTION INTRAMUSCULAR; INTRAVENOUS
Status: DISCONTINUED | OUTPATIENT
Start: 2019-05-28 | End: 2019-05-28

## 2019-05-28 RX ORDER — FERROUS GLUCONATE 324(38)MG
324 TABLET ORAL
Status: DISCONTINUED | OUTPATIENT
Start: 2019-05-28 | End: 2019-05-30 | Stop reason: HOSPADM

## 2019-05-28 RX ORDER — OXYTOCIN/0.9 % SODIUM CHLORIDE 30/500 ML
100-340 PLASTIC BAG, INJECTION (ML) INTRAVENOUS CONTINUOUS PRN
Status: COMPLETED | OUTPATIENT
Start: 2019-05-28 | End: 2019-05-28

## 2019-05-28 RX ORDER — ONDANSETRON 2 MG/ML
4 INJECTION INTRAMUSCULAR; INTRAVENOUS EVERY 6 HOURS PRN
Status: DISCONTINUED | OUTPATIENT
Start: 2019-05-28 | End: 2019-05-28

## 2019-05-28 RX ORDER — IBUPROFEN 800 MG/1
800 TABLET, FILM COATED ORAL
Status: DISCONTINUED | OUTPATIENT
Start: 2019-05-28 | End: 2019-05-28

## 2019-05-28 RX ORDER — BISACODYL 10 MG
10 SUPPOSITORY, RECTAL RECTAL DAILY PRN
Status: DISCONTINUED | OUTPATIENT
Start: 2019-05-30 | End: 2019-05-30 | Stop reason: HOSPADM

## 2019-05-28 RX ORDER — OXYCODONE AND ACETAMINOPHEN 5; 325 MG/1; MG/1
1 TABLET ORAL
Status: DISCONTINUED | OUTPATIENT
Start: 2019-05-28 | End: 2019-05-28

## 2019-05-28 RX ORDER — OXYTOCIN 10 [USP'U]/ML
10 INJECTION, SOLUTION INTRAMUSCULAR; INTRAVENOUS
Status: DISCONTINUED | OUTPATIENT
Start: 2019-05-28 | End: 2019-05-30 | Stop reason: HOSPADM

## 2019-05-28 RX ADMIN — FERROUS GLUCONATE 324 MG: 324 TABLET ORAL at 08:39

## 2019-05-28 RX ADMIN — IBUPROFEN 800 MG: 800 TABLET ORAL at 08:39

## 2019-05-28 RX ADMIN — IBUPROFEN 800 MG: 800 TABLET ORAL at 21:16

## 2019-05-28 RX ADMIN — IBUPROFEN 800 MG: 800 TABLET ORAL at 02:32

## 2019-05-28 RX ADMIN — FERROUS GLUCONATE 324 MG: 324 TABLET ORAL at 18:37

## 2019-05-28 RX ADMIN — PRENATAL VIT W/ FE FUMARATE-FA TAB 27-0.8 MG 1 TABLET: 27-0.8 TAB at 08:39

## 2019-05-28 RX ADMIN — SENNOSIDES AND DOCUSATE SODIUM 1 TABLET: 8.6; 5 TABLET ORAL at 08:39

## 2019-05-28 RX ADMIN — OXYTOCIN-SODIUM CHLORIDE 0.9% IV SOLN 30 UNIT/500ML 340 ML/HR: 30-0.9/5 SOLUTION at 01:44

## 2019-05-28 RX ADMIN — IBUPROFEN 800 MG: 800 TABLET ORAL at 14:38

## 2019-05-28 RX ADMIN — SODIUM CHLORIDE, POTASSIUM CHLORIDE, SODIUM LACTATE AND CALCIUM CHLORIDE: 600; 310; 30; 20 INJECTION, SOLUTION INTRAVENOUS at 00:10

## 2019-05-28 RX ADMIN — SENNOSIDES AND DOCUSATE SODIUM 1 TABLET: 8.6; 5 TABLET ORAL at 21:05

## 2019-05-28 NOTE — PLAN OF CARE
Patient JESSE South African speaking, nieces present and interpreting per patient preference, declined interpretor. Patient vitally stable, ambulating independently, voiding without issue, tolerating regular diet. Postpartum checks WDL. Pain adequately managed with prn Ibuprofen. Breast and bottle feeding, nipple shield introduced and nipple cream provided. Patient is a full staff assist with nursing. Nieces present and supportive, assisting with infant cares. Interpretor ordered for tomorrow morning to assist with medical terminology. Patient attentive to infant.

## 2019-05-28 NOTE — PROGRESS NOTES
Data: Caleb Talley transferred to [facility] via wheelchair at 0415. Baby transferred via parent's arms.  Action: Receiving unit notified of transfer: Yes. Patient and family notified of room change. Belongings sent to receiving unit. Accompanied by Registered Nurse. Oriented patient to surroundings. Call light within reach. ID bands double-checked with VITALIY Odell.  Response: Patient tolerated transfer and is stable.

## 2019-05-28 NOTE — LACTATION NOTE
This note was copied from a baby's chart.  LC visit.  Her baby has had borderline low blood sugars and is spitty after frequent formula feeds. Infant had previously not been to breast.  LC assisted with latch and positioning and encouraged her to nurse frequently.  Her baby was interested in nursing and had repeated attempts and some successful latching with LC hands on assist.  Her baby tends to suck on his tongue and slide off the nipple, but with re-approaching, will obtain a good latch.  Much support with feeds and positioning will be required until Caleb becomes more independent with placing infant to breast.  HE also used after nursing to spoon feed additional colostrum.  Small drops noted.  LC will continue to assist and follow up tomorrow.  Supplements continue per MD recommendation for borderline SGA status and sugars.

## 2019-05-28 NOTE — ANESTHESIA PREPROCEDURE EVALUATION
"Anesthesia Pre-Procedure Evaluation    Patient: Caleb Talley   MRN: [id number] : 1985          Preoperative Diagnosis: * No surgery found *        Past Medical History:   Diagnosis Date     Blood type A+ 2019     Rubella non-immune status, antepartum 2019     Past Surgical History:   Procedure Laterality Date     NO HISTORY OF SURGERY       Anesthesia Evaluation       history and physical reviewed .             ROS/MED HX    ENT/Pulmonary:  - neg pulmonary ROS     Neurologic:       Cardiovascular:  - neg cardiovascular ROS       METS/Exercise Tolerance:     Hematologic:         Musculoskeletal:         GI/Hepatic:         Renal/Genitourinary:         Endo:         Psychiatric:         Infectious Disease:         Malignancy:         Other:                     neg OB ROS            Physical Exam      Airway   Mallampati: II  TM distance: > 3 FB  Neck ROM: full    Dental     Cardiovascular       Pulmonary             Lab Results   Component Value Date    WBC 5.4 2019    HGB 11.9 2019    HCT 31.5 (L) 2019     2019     2019    POTASSIUM 3.6 2019    CHLORIDE 105 2019    CO2 23 2019    BUN 5 (L) 2019    CR 0.46 (L) 2019     (H) 2019    SERA 9.1 2019       Preop Vitals  BP Readings from Last 3 Encounters:   19 113/74   19 124/80   19 106/66    Pulse Readings from Last 3 Encounters:   19 140   19 100   19 106      Resp Readings from Last 3 Encounters:   19 18   19 16   04/10/19 18    SpO2 Readings from Last 3 Encounters:   19 99%   19 100%   19 100%      Temp Readings from Last 1 Encounters:   19 98.1  F (36.7  C) (Oral)    Ht Readings from Last 1 Encounters:   19 1.486 m (4' 10.5\")      Wt Readings from Last 1 Encounters:   19 75.3 kg (166 lb)    Estimated body mass index is 34.1 kg/m  as calculated from the following:    Height as of " "3/20/19: 1.486 m (4' 10.5\").    Weight as of 5/22/19: 75.3 kg (166 lb).       Anesthesia Plan      History & Physical Review      ASA Status:  2 .             Postoperative Care      Consents  Anesthetic plan, risks, benefits and alternatives discussed with:  Patient..                 Miguel Ng MD                    .  "

## 2019-05-28 NOTE — H&P
35 yo  who presented in active labor at 39+4 wks. She was 6cm on presentation to the [facility]. Once admitted, she had progressed to 10cm.     No change in medical history since last prenatal entry    [place] q 2 min  Cat 1 tracing    Expect   GBS negative

## 2019-05-28 NOTE — PROGRESS NOTES
/MLE (patient request) of male infant. Thick mec noted after delivery of head and torso. Delayed cord clamping performed. Placenta delivered intact and discarded. 2nd degree MLE repaired.

## 2019-05-28 NOTE — L&D DELIVERY NOTE
Delivery Date:  2019      The patient is a 34-year-old primigravida who presented in active labor at 39 weeks and 4 days gestation.  Upon evaluation in the triage area, she was 6 cm dilated with a reactive NST and was rachel spontaneously every 2 minutes.  She reported spontaneous rupture of membranes and meconium-stained fluid was observed.      She was admitted to Labor and Delivery and the process for starting an epidural placement was made, but patient was noted to be complete dilation at a +2 station.  She had a 35-minute second stage for a spontaneous vaginal delivery over a midline episiotomy of a male infant.  Apgars are not determined at this time.  Of note,  the patient had evidence of a female circumcision.  She and her family requested an episiotomy.  They were counseled that current medical protocols discourages episiotomy, but to honor their request a small midline episiotomy was made.  The infant was delivered.  Delayed cord clamping was performed.  The infant was brought to the warmer for assessment by the  nurse practitioner for suspicion for meconium related respiratory issues.  A second-degree midline perineal episiotomy was repaired with 3-0 Vicryl suture under local anesthesia.  There was a small, 1cm, bloodless tear superiorly where she had previous female circumcision. However, the tissue was so thin and avascular that a stitch was not placed due to concern for poor healing. Quantitative blood loss is pending, but there was approximately 150 mL in the bag with one sponge to be weighted.       Both infant and mother were doing well at the time of dictation.  Weight and Apgars are pending.         MELODY ACKERMAN MD             D: 2019   T: 2019   MT: JEOVANY      Name:     EVELYN MOROCHO   MRN:      [id number]        Account:        [id number]   :      1985        Delivery Date:  2019               Document: [id number]

## 2019-05-28 NOTE — PROVIDER NOTIFICATION
Dr. Hoffmann returned page, this writer notified Dr. Hoffmann of patient delivery overnight.  Dr. Hoffmann stated she will assume care of patient and will round on patient during the day today.

## 2019-05-28 NOTE — TELEPHONE ENCOUNTER
Miceala calling from [facility] L&D.  States paged Dr. Hoffmann to let her know has patient in L&D and did not get response.  Caleb delivered already.  Discussed Dr. Hoffmann only in clinic on M and W.  Advised I would enter note but if need her to try and reach her again.  Raquel Park RN

## 2019-05-28 NOTE — PLAN OF CARE
Assumed care from SHANTELL Saldivar at 0600.  Pt stable at this time.  Encouraged her to get up to the bathroom within the hour.  Will continue to monitor.

## 2019-05-28 NOTE — PROVIDER NOTIFICATION
05/28/19 0100   Provider Notification   Provider Name/Title Dr. POPEYE Green   Method of Notification At Bedside   Dr. Green at bedside for delivery.

## 2019-05-28 NOTE — PROVIDER NOTIFICATION
05/28/19 0038   Provider Notification   Provider Name/Title Dr. Mercado   Method of Notification Phone   Request Attend Delivery   Dr. Mercado updated re: Pt is 10/+2. Will head to hospital.

## 2019-05-28 NOTE — PROGRESS NOTES
SUBJECTIVE:  Caleb Talley is a 34 year old woman who is on postpartum day # 0 - delivered 12 hours ago.  She states that her bleeding has diminished.  She is having some cramping pain and that pain is under control with the available medication.  She has no concerns at this time.      Objective:   Blood pressure 97/56, pulse 112, temperature 98.5  F (36.9  C), temperature source Axillary, resp. rate 16, last menstrual period 09/15/2018.  Fundus is firm and 1 FB below the umbilicus  Ext: no edema  Perineum: no concerns    HGB: not done    Assessment:  1. Postpartum day # 0    Plan:  1. Check HGB tomorrow  2. Expect discharge on Thursday

## 2019-05-28 NOTE — PLAN OF CARE
Data: [facility]: 2019 11:13 PM.  Reason for maternal/fetal assessment is uterine contractions, leaking vaginal fluid, vaginal bleeding. Patient reports uterine contractions, leaking vaginal fluid, and small amount of vaginal bleeding.  Patient is a .  Prenatal record reviewed. Pregnancy has been uncomplicated..  Gestational Age 39w4d. VSS. Fetal movement present. Support person is present.   Action: Verbal consent for EFM.  Response: Patient verbalized agreement with plan. Will contact Dr. Rin Hoffmann with update and further orders.

## 2019-05-28 NOTE — PROVIDER NOTIFICATION
05/28/19 0014   Provider Notification   Provider Name/Title Dr. Mercado   Method of Notification Phone   Request Evaluate - Remote   Notification Reason Other (Comment)   Dr. Mercado paged as Dr. Rin Hoffmann unable to be reached. Intrapartum orders received, epidural okay,call for any further orders - will cover this patient until Dr. Hoffmann is reached.

## 2019-05-29 LAB — HGB BLD-MCNC: 10.9 G/DL (ref 11.7–15.7)

## 2019-05-29 PROCEDURE — 12000000 ZZH R&B MED SURG/OB

## 2019-05-29 PROCEDURE — 25000132 ZZH RX MED GY IP 250 OP 250 PS 637: Performed by: OBSTETRICS & GYNECOLOGY

## 2019-05-29 PROCEDURE — 85018 HEMOGLOBIN: CPT | Performed by: OBSTETRICS & GYNECOLOGY

## 2019-05-29 PROCEDURE — 36415 COLL VENOUS BLD VENIPUNCTURE: CPT | Performed by: OBSTETRICS & GYNECOLOGY

## 2019-05-29 RX ADMIN — SENNOSIDES AND DOCUSATE SODIUM 1 TABLET: 8.6; 5 TABLET ORAL at 21:42

## 2019-05-29 RX ADMIN — FERROUS GLUCONATE 324 MG: 324 TABLET ORAL at 13:45

## 2019-05-29 RX ADMIN — IBUPROFEN 800 MG: 800 TABLET ORAL at 13:45

## 2019-05-29 RX ADMIN — FERROUS GLUCONATE 324 MG: 324 TABLET ORAL at 08:14

## 2019-05-29 RX ADMIN — IBUPROFEN 800 MG: 800 TABLET ORAL at 08:14

## 2019-05-29 RX ADMIN — IBUPROFEN 800 MG: 800 TABLET ORAL at 21:42

## 2019-05-29 RX ADMIN — FERROUS GLUCONATE 324 MG: 324 TABLET ORAL at 18:51

## 2019-05-29 RX ADMIN — PRENATAL VIT W/ FE FUMARATE-FA TAB 27-0.8 MG 1 TABLET: 27-0.8 TAB at 08:14

## 2019-05-29 RX ADMIN — SENNOSIDES AND DOCUSATE SODIUM 1 TABLET: 8.6; 5 TABLET ORAL at 08:15

## 2019-05-29 NOTE — PLAN OF CARE
Becoming somewhat more independent with breast feeding, using shield. Still needing staff assist with positioning and latch with sleepy baby. Ambulating in room, denies difficulty voiding. Ibuprofen for pain with stated relief. Family present in room, supportive and participating in baby cares. Translating as needed as patient declines .

## 2019-05-29 NOTE — PLAN OF CARE
Patient VSS. Independent with cares and ambulation. Education and support provided. Pt was mainly bottle feeding baby, during the night asked to take baby to nursery . Bonding observed. Niece is at bedside, supportive with translating some communication between nurse and patient.. Education and support provided. Continue to monitor and assist per pt care plan and needs.

## 2019-05-29 NOTE — LACTATION NOTE
This note was copied from a baby's chart.  LC follow up.  Infant appeared interested in latching when LC entered the room.  Caleb had recently finished nursing him but he was held and rooting.  LC encouraged her to place him back to breast.  He did latch on and off, he did not sustain the latch.  He then became spitty and disinterested. He is also taking formula.   encouraged her to call for assistance with breastfeeding when needed.

## 2019-05-29 NOTE — PLAN OF CARE
Patient meeting expected goals. Is up independent in room, meeting all personal and infant needs. VSS. Pain is being managed with Ibuprofen and offered Ice and Tucks pads for perineum discomfort.  Fundus firm, midline, voiding without difficulty. Is both breast and bottle feeding infant; latch score of 10.  Niece, present and supportive at bedside.  Encouraged walking spears to nursery for infant bath.

## 2019-05-29 NOTE — PROGRESS NOTES
SUBJECTIVE:  Caleb Talley is a 34 year old woman who is on postpartum day # 1.  She states that her bleeding has diminished.  She is having some cramping pain and that pain is under control with the available medication.  Her main pain which is under control is her rectal pain. She has no concerns at this time.      Objective:   Blood pressure 124/73, pulse 84, temperature 98.8  F (37.1  C), temperature source Oral, resp. rate 18, last menstrual period 09/15/2018.  Fundus is firm and 1 FB below the umbilicus  Ext: no edema  Perineum: no concerns    HGB: 10.9    Assessment:  1. Postpartum day # 1    Plan:  1. Expect discharge tomorrow  2. Will have rx for stool softener

## 2019-05-30 VITALS
RESPIRATION RATE: 16 BRPM | SYSTOLIC BLOOD PRESSURE: 124 MMHG | HEART RATE: 84 BPM | DIASTOLIC BLOOD PRESSURE: 73 MMHG | TEMPERATURE: 98.3 F

## 2019-05-30 PROCEDURE — 25000128 H RX IP 250 OP 636: Performed by: FAMILY MEDICINE

## 2019-05-30 PROCEDURE — 90707 MMR VACCINE SC: CPT | Performed by: FAMILY MEDICINE

## 2019-05-30 PROCEDURE — 25000132 ZZH RX MED GY IP 250 OP 250 PS 637: Performed by: OBSTETRICS & GYNECOLOGY

## 2019-05-30 RX ORDER — PRENATAL VIT/IRON FUM/FOLIC AC 27MG-0.8MG
1 TABLET ORAL DAILY
Qty: 100 TABLET | Refills: 1 | Status: SHIPPED | OUTPATIENT
Start: 2019-05-31 | End: 2019-10-23

## 2019-05-30 RX ORDER — LANOLIN 100 %
OINTMENT (GRAM) TOPICAL
COMMUNITY
Start: 2019-05-30 | End: 2022-07-26

## 2019-05-30 RX ORDER — IBUPROFEN 800 MG/1
800 TABLET, FILM COATED ORAL EVERY 6 HOURS PRN
Qty: 30 TABLET | Refills: 0 | Status: SHIPPED | OUTPATIENT
Start: 2019-05-30 | End: 2019-06-19 | Stop reason: DRUGHIGH

## 2019-05-30 RX ORDER — AMOXICILLIN 250 MG
1 CAPSULE ORAL 2 TIMES DAILY
Qty: 30 TABLET | Refills: 1 | Status: SHIPPED | OUTPATIENT
Start: 2019-05-30 | End: 2020-09-23

## 2019-05-30 RX ORDER — FERROUS GLUCONATE 324(38)MG
324 TABLET ORAL
Qty: 90 TABLET | Refills: 1 | Status: SHIPPED | OUTPATIENT
Start: 2019-05-30 | End: 2019-10-23

## 2019-05-30 RX ADMIN — MEASLES, MUMPS, AND RUBELLA VIRUS VACCINE LIVE 0.5 ML: 1000; 12500; 1000 INJECTION, POWDER, LYOPHILIZED, FOR SUSPENSION SUBCUTANEOUS at 11:37

## 2019-05-30 RX ADMIN — FERROUS GLUCONATE 324 MG: 324 TABLET ORAL at 09:01

## 2019-05-30 RX ADMIN — PRENATAL VIT W/ FE FUMARATE-FA TAB 27-0.8 MG 1 TABLET: 27-0.8 TAB at 09:01

## 2019-05-30 RX ADMIN — SENNOSIDES AND DOCUSATE SODIUM 2 TABLET: 8.6; 5 TABLET ORAL at 09:01

## 2019-05-30 RX ADMIN — IBUPROFEN 800 MG: 800 TABLET ORAL at 04:00

## 2019-05-30 RX ADMIN — IBUPROFEN 800 MG: 800 TABLET ORAL at 10:11

## 2019-05-30 NOTE — PLAN OF CARE
Discharge instructions completed with [facility]  present.  Patient states she understands all discharge instructions and all her questions have been answered.  Verbalizes when she needs to return to clinic for follow up for herself and baby.  She is caring for herself and her baby independently.  Prescriptions reviewed an meds given.  Postpartum depression symptoms reviewed and encouraged frequent review of depression scale. Discharged with family at 1245.

## 2019-05-30 NOTE — PROGRESS NOTES
PHN met with patient briefly to discuss PHN home visiting, patient declined service, did not complete Info on [facility] due hospital staff in room.

## 2019-05-30 NOTE — DISCHARGE SUMMARY
SUBJECTIVE:  Caleb Talley is a 34 year old woman who is on postpartum day # 2.  She states that her bleeding has diminished.  She is having some cramping pain and that pain is under control with the available medication.  She has no concerns at this time.      Objective:   Blood pressure 124/73, pulse 84, temperature 98.3  F (36.8  C), temperature source Oral, resp. rate 16, last menstrual period 09/15/2018, unknown if currently breastfeeding.  Fundus is firm and 1 FB below the umbilicus  Ext: no edema  Perineum: no concerns    Assessment:  1. Postpartum day # 2    Plan:  1. Discharge to home  2. Follow up in 6 weeks for postpartum exam  3. Discharge meds:     1) prenatal vitamins, one orally per day  2) ibuprofen 600mg orally every 6 hours as needed for pain  3) stool softener  4) iron

## 2019-05-30 NOTE — PROVIDER NOTIFICATION
05/30/19 1152   Provider Notification   Provider Name/Title Dr. Hoffmann   Method of Notification Phone   Request Evaluate-Remote   Notification Reason Other   Spoke with Dr. Hoffmann regarding Fe+ TID for discharge with a hemoglobin of 10.9. MD ott with patient reducing to twice a day for a week or so if she is constipated or having upset stomach, and then increasing to TID as tolerated. Primary RN updated.

## 2019-05-30 NOTE — PLAN OF CARE
Comfortable on IBU.  States breasts leaking; initiated breast pumping when infant sleepy and not wanting to breast feed.  Expressed 20 and 25 mls at two pumping  sessions.  Up  independently.  English speaking niece at bedside.  Cont with plan of care.

## 2019-05-30 NOTE — PLAN OF CARE
Pt meeting expected outcomes. Vitals stable. Fundus firm and midline. Denies difficulty voiding. Ibuprofen controlling pain. Pt is breastfeeding, pumping EBM and formula feeding infant. Independent with self and baby cares. Anticipated discharge home today.

## 2019-05-30 NOTE — DISCHARGE INSTRUCTIONS
Vaginal Delivery Discharge Instructions: [facility]  Waxqabadka:     Waydiiso qoyska iyo saaxibadaa inay ku caawiyaan markaad u baahantahay.    Waxba flor kor saarin ama flor galin farjigaaga ilaa uu dhakhtarkaagu ansixiyo.    Si fudud u qaado dhowrka asbuuc e xiga si aad u ogalaato in jirkaagu carson kabto. Waxaad samayn kartaa howl kasta oo aad rabto ilaa meeshaas laga gaarayo.    Gaadhi flor wadin markaad qaadanayso  kaniiniyada xanuunka ee dhkhatarku kuu qoray . waxaad gaadhi wadi kartaa haddii aad qaadanayso kaniiniyada xanuunka ee koontarka.    Wac bixiyahaaga daryeelka caafimaaad haddii aad qabtid mid ka mid ah calaamadahan carson socda:    Haddii suufka dhiigga nuuga uu ku buuxsamo 1 saac gudihiis, ama aad aragto xinjiro dhiig ah oo ka wayn kubadda golafka.     Dhiig soconaya wax kabadan 6 asbuuc.    Haddii aad qabto dheecaan farjiga ka imaanaya oo  si xun u uraya.     [facility]  F (38  C) am aka sii saraysa (heerkulka laga qaaday carabka hoostiiisa), oo qarqaryo leh ama aan lahayn     Xanuun, nabar, majiirid daran oo aad ka dareeto qaybta hoose ee ubucda.    Xanuun sii kordya, barar, guduudasho ama dheecaan ka dhiimaya meesha la tolay ee qaliinka.    Kaadi badan oo dagdag ah oo markasta ku qabanaysa , ama gubasho aad dareento markaad kaajayso.    Guduudasho, barar, ama xanuun aad ka dareento xididada lugta.    Dhibaato kaa haysata naas nuujinta, ama guduudasho ama xanuun naaska ah.    Xanuun sii kordhaya ama aan ka dhamaanayn meesha la tolay ama danqashada dilaaca.    Lalabbo ama matag.    Xabad xanuun iyo qufac ama naqaska oo kuowen benitez.    Dhibaato ay la socoto murugsangita, janes, aa lucy.     Haddii aad qabtid wax lucy godinez oo ku saabsmisael inaad waxyeelayso naftaada ama ilmaha, wac miles ameliajosef lópeziisincere.     Haddii aad qabto cope aalo niharika garcia noqoto kadib.    Gacmahaagga nadiifi:  Markasta dhaqa gacahaaga ka hor inta aadan taaban farjiga agagaarkiisa  iyo meesha la tolay. Matthew amador  caawiniaysaa ismael gomezado infakshanku. Hdii gacmahaagu franklin caldera gacmaha radha tirtirpatricia zhou u nadiifiso gacmahaaga. Anililiz nadiifi ooo jar.      Vaginal Delivery Discharge Instructions  Activity:     Ask family and friends for help when you need it.    Do not place anything in your vagina until your doctor approves.    Take it easy for the next few weeks to allow your body to recover. You may do any activities you feel up to at that point.    Do not drive while taking pain pills prescribed by your doctor. You may drive if taking over-the-counter pain pills.    Call your health care provider if you have any of these symptoms:    You soak a sanitary pad with blood within 1 hour, or you see blood clots larger than a golf ball.    Bleeding that lasts more than 6 weeks.    You have vaginal discharge that smells bad.     A fever of 100.4  F (38  C) or higher (temperature taken under your tongue), with or without chills     Severe, pain, cramping or tenderness in your lower belly area.    Increased pain, swelling, redness or fluid around your stitches.    A more frequent or urgent need to urinate (pee), or it burns when you pee.    Redness, swelling or pain around a vein in your leg.    Problems breastfeeding, or a red or painful area on your breast.    Pain that increases or does not go away from an episiotomy or perineal tear.    Nausea and vomiting.    Chest pain and cough or are gasping for air.    Problems coping with sadness, anxiety, or depression.     If you have any concerns about hurting yourself or the baby, call your doctor right away.      You have questions or concerns after you return home.    Keep your hands clean:  Always wash your hands before touching your perineal area and stitches.  This helps reduce your risk of infection.  If your hands aren t dirty, you may use an alcohol hand-rub to clean your hands. Keep your nails clean and short.

## 2019-05-30 NOTE — LACTATION NOTE
This note was copied from a baby's chart.  LC follow up with .  Infant was in the NBN for his circumcision.  Caleb continues to struggle with latching infant and gives formula.  When SHANNAN spoke with NBN nurse, she reported a large formula emesis after the circumcision.  Infant was then returned to mother and LC assisted with latch.  Infant was able to latch on the right side with use of the 24mm nipple shield.  Milk was visualized within the shield and swallows present.  Plan for follow up lactation phone call after discharge.  SHANNAN also encouraged exclusive breastfeeding since her baby is not tolerating formula well and her milk is coming in.  She pumped over the night last night and will continue to do some pumping at home as well.

## 2019-06-18 ENCOUNTER — TELEPHONE (OUTPATIENT)
Dept: OBGYN | Facility: CLINIC | Age: 34
End: 2019-06-18

## 2019-06-19 RX ORDER — IBUPROFEN 600 MG/1
600 TABLET, FILM COATED ORAL EVERY 6 HOURS PRN
Qty: 30 TABLET | Refills: 1 | Status: SHIPPED | OUTPATIENT
Start: 2019-06-19 | End: 2023-11-29

## 2019-09-04 ENCOUNTER — PRENATAL OFFICE VISIT (OUTPATIENT)
Dept: FAMILY MEDICINE | Facility: CLINIC | Age: 34
End: 2019-09-04
Payer: COMMERCIAL

## 2019-09-04 VITALS
DIASTOLIC BLOOD PRESSURE: 82 MMHG | TEMPERATURE: 97.8 F | SYSTOLIC BLOOD PRESSURE: 125 MMHG | HEART RATE: 90 BPM | BODY MASS INDEX: 32.25 KG/M2 | RESPIRATION RATE: 16 BRPM | WEIGHT: 157 LBS

## 2019-09-04 LAB
HGB BLD-MCNC: 12.1 G/DL (ref 11.7–15.7)
TSH SERPL DL<=0.005 MIU/L-ACNC: 2.38 MU/L (ref 0.4–4)

## 2019-09-04 PROCEDURE — 36415 COLL VENOUS BLD VENIPUNCTURE: CPT | Performed by: FAMILY MEDICINE

## 2019-09-04 PROCEDURE — 84443 ASSAY THYROID STIM HORMONE: CPT | Performed by: FAMILY MEDICINE

## 2019-09-04 PROCEDURE — 99207 ZZC POST PARTUM EXAM: CPT | Performed by: FAMILY MEDICINE

## 2019-09-04 PROCEDURE — 00000218 ZZHCL STATISTIC OBHBG - HEMOGLOBIN: Performed by: FAMILY MEDICINE

## 2019-09-04 NOTE — LETTER
[facility]  [address] [place], [place], [id number] [phone]        September 4, 2019    RE:  Caleb Talley                                                                                                                                                       [address] [address]            To Whom It May Concern,   Please excuse Butch Moeller from school for the first part of today as he attended a medical appointment with his aunt to help her out.           Sincerely,    Rin Hoffmann M.D.

## 2019-09-04 NOTE — PROGRESS NOTES
Caleb is here for a 6-week postpartum checkup.    Delivery date was 19. She had a  of a viable boy, weight 5 pounds 10 oz., with no complications.  Since delivery, she has been breast feeding and giving formula as well.  She has no signs of infection, bleeding or other complications.  She is not pregnant.  We discussed contraceptions and she has chosen nothing as her  is not here.    She  has not had intercourse since delivery and complains of No discomfort. Patient screened for postpartum depression and complaints are NEGATIVE.     She stopped bleeding in 2019 and has had symptoms like her period is coming since then but has not gotten her period since then.   Wonders if this is ok.       EXAM:  Blood pressure 125/82, pulse 90, temperature 97.8  F (36.6  C), temperature source Oral, resp. rate 16, weight 71.2 kg (157 lb), unknown if currently breastfeeding.  GENERAL healthy, alert and no distress  EYES EOMI, PERRL and funduscopic deferred  HENT: Normocephalic. TM's grossly normal, oropharynx without significant findings.  NECK: no adenopathy and thyroid normal to palpation  RESP: clear to auscultation bilaterally without wheezes or rales   BREASTS: NEGATIVE for erythema and warmth and no nodules are palpated - there is no axillary or supraclavicular lymphadenopathy  CV: Regular rate and rhythum without murmur   GI: bowel sounds normal, liver span normal to percussion, no masses palpable and soft, non-tender  GYN PELVIC: normal external genitalia, normal groin lymphatics, normal urethral meatus, normal vaginal mucosa, normal cervix and normal adnexa, no masses or tenderness  MS: No varicosities. Good peripheral pulses., Extremities normal. No deformaties, edema or skin discoloration.  SKIN: no ulcers, lesions or rash  NEURO: alert/oriented to person, location and time, brisk, symmetric reflexes at knees and biceps b/l      ASSESSMENT:   Normal postpartum exam after .  Does not need birth  control    PLAN:.  1. Pap NOT obtained  [facility]   3. Will follow up in 1 year for physical and as needed

## 2019-09-04 NOTE — LETTER
September 5, 2019      Caleb Talley  [address] [address]        Dear ,    We are writing to inform you of your test results.    Normal labs.    Resulted Orders   OB HEMOGLOBIN   Result Value Ref Range    Hemoglobin 12.1 11.7 - 15.7 g/dL   TSH with free T4 reflex   Result Value Ref Range    TSH 2.38 0.40 - 4.00 mU/L       If you have any questions or concerns, please call the clinic at the number listed above.       Sincerely,        Rin Hoffmann MD/AL

## 2019-09-04 NOTE — PATIENT INSTRUCTIONS
Thank you for choosing [facility] today for your health care needs.     [facility] is transforming primary care  At [facility], we re dedicated to constantly improve how we serve the health care needs of our patients and communities. We re currently making changes to the way we deliver care.     Changes you ll notice include:    An emphasis on building a relationship with a primary care provider    Access to a PAL (personal advocate and liaison) to help guide you with your care needs    Appointment lengths tailored to your specific needs and greater access to a care team to help you and your provider improve and maintain your health and well-being    Improved online access to your care team    Benefits of a primary care provider  If you don t have a designated primary care provider, we encourage you to get to know our care team online and find a provider you d like to see. Most of our providers have a short video on their online provider page. Visit [email or web address] to explore our providers and locations.    Benefits of having a primary care provider include:      They get to know you - your health history, family history and goals, making it easier to make a health plan together.     You get to know them - making health-related conversations and decisions easier      Primary care doctors help you when you re sick or hurt - but also focus on keeping you healthy with preventive care and screenings.      A doctor who sees you regularly is more likely to notice changes in your health.     You ll be connected to a broad care team who partners with your provider to support you.    Patient Advocate Liaison (PAL)   To help make sure you get the right care, at the right time, we include PALs, or Patient Advocate Liaisons, as part of your care team. Your PAL will be your first line of contact. They ll advocate for your needs and help you navigate our services, connecting you with care team members and community resources to ensure  your care is well coordinated. You ll be introduced to a PAL in an upcoming visit.     Expanded care team access with tailored appointment lengths  Depending on your health care needs, you may have longer or shorter appointments and see additional care team providers - including Medication Therapy Management (MTM) pharmacists, diabetes educators, behavioral health clinicians, or social workers. At times, they may be included in your visit with your provider, or you may see them individually.     Online access to your health care records and care team  [facility] is our online tool that makes it easy to see your health care information and communicate with your care team.     [facility] allows you to:     View your health maintenance plan so you know when you re due for a preventive screening    Send secure messages to your care team    View your health history and visit summaries     Schedule appointments     Complete questionnaires and eCheck-in before appointments      Get care from your provider with an e-visit      View and pay your bill     Sign up at [email or web address]. Once you have an account, you also can download the mobile michel.

## 2019-10-23 ENCOUNTER — TELEPHONE (OUTPATIENT)
Dept: FAMILY MEDICINE | Facility: CLINIC | Age: 34
End: 2019-10-23

## 2019-10-23 DIAGNOSIS — O99.011 ANEMIA DURING PREGNANCY IN FIRST TRIMESTER: ICD-10-CM

## 2019-10-23 RX ORDER — FERROUS GLUCONATE 324(38)MG
324 TABLET ORAL
Qty: 90 TABLET | Refills: 1 | Status: SHIPPED | OUTPATIENT
Start: 2019-10-23 | End: 2020-09-23

## 2019-10-23 RX ORDER — PRENATAL VIT/IRON FUM/FOLIC AC 27MG-0.8MG
1 TABLET ORAL DAILY
Qty: 100 TABLET | Refills: 3 | Status: SHIPPED | OUTPATIENT
Start: 2019-10-23 | End: 2020-03-02

## 2019-12-19 ENCOUNTER — HOSPITAL ENCOUNTER (EMERGENCY)
Facility: CLINIC | Age: 34
Discharge: HOME | End: 2019-12-19
Attending: PHYSICIAN ASSISTANT | Admitting: PHYSICIAN ASSISTANT
Payer: COMMERCIAL

## 2019-12-19 VITALS
RESPIRATION RATE: 16 BRPM | HEART RATE: 109 BPM | SYSTOLIC BLOOD PRESSURE: 124 MMHG | BODY MASS INDEX: 31.98 KG/M2 | DIASTOLIC BLOOD PRESSURE: 88 MMHG | WEIGHT: 155.65 LBS | OXYGEN SATURATION: 99 % | TEMPERATURE: 97.1 F

## 2019-12-19 DIAGNOSIS — S05.02XA ABRASION OF LEFT CORNEA, INITIAL ENCOUNTER: ICD-10-CM

## 2019-12-19 PROCEDURE — 99283 EMERGENCY DEPT VISIT LOW MDM: CPT

## 2019-12-19 RX ORDER — PROPARACAINE HYDROCHLORIDE 5 MG/ML
2 SOLUTION/ DROPS OPHTHALMIC ONCE
Status: DISCONTINUED | OUTPATIENT
Start: 2019-12-19 | End: 2019-12-19

## 2019-12-19 RX ORDER — TETRACAINE HYDROCHLORIDE 5 MG/ML
2 SOLUTION OPHTHALMIC ONCE
Status: DISCONTINUED | OUTPATIENT
Start: 2019-12-19 | End: 2019-12-19 | Stop reason: HOSPADM

## 2019-12-19 RX ORDER — ERYTHROMYCIN 5 MG/G
0.5 OINTMENT OPHTHALMIC 4 TIMES DAILY
Qty: 1 G | Refills: 0 | Status: SHIPPED | OUTPATIENT
Start: 2019-12-19 | End: 2020-09-23

## 2019-12-19 ASSESSMENT — ENCOUNTER SYMPTOMS
EYE PAIN: 1
FEVER: 0

## 2019-12-19 NOTE — ED AVS SNAPSHOT
[facility] Emergency Department  [address]  [facility] [address]  Phone:  [phone]  Fax:  [phone]                                    Caleb Talley   MRN: [id number]    Department:  [facility] Emergency Department   Date of Visit:  12/19/2019           After Visit Summary Signature Page    I have received my discharge instructions, and my questions have been answered. I have discussed any challenges I see with this plan with the nurse or doctor.    ..........................................................................................................................................  Patient/Patient Representative Signature      ..........................................................................................................................................  Patient Representative Print Name and Relationship to Patient    ..................................................               ................................................  Date                                   Time    ..........................................................................................................................................  Reviewed by Signature/Title    ...................................................              ..............................................  Date                                               Time          22EPIC Rev 08/18

## 2019-12-20 NOTE — ED PROVIDER NOTES
History     Chief Complaint:  Eye Problem    The history is provided by the patient and a relative. The history is limited by a language barrier. No  was used (family member acted as ).      Caleb Talley is a 34 year old female who presents with a scratch to her left eye. At 1400 this afternoon, a baby reached up and scratched her left eye. Her eye has been painful and teary since. The patient's vision is slightly blurry. The patient does not wear contact lenses. She denies fever or any other symptoms.  She has no further concerns at this time.    Allergies:  No Known Allergies     Medications:    Fergon  Zofran  Zantac  Senna-docusate     Past Medical History:    The patient denies any significant past medical history.    Past Surgical History:    The patient does not have any pertinent past surgical history.    Family History:    No past pertinent family history.    Social History:  Presents to the ED her family at the bedside.   Tobacco Use: no  Alcohol Use: no  Drug Use: no  Marital Status:   [2]     Review of Systems   Constitutional: Negative for fever.   Eyes: Positive for pain and visual disturbance (blurry).        (+) tearing   All other systems reviewed and are negative.    Physical Exam     Patient Vitals for the past 24 hrs:   BP Temp Temp src Pulse Resp SpO2 Weight   12/19/19 1838 124/88 97.1  F (36.2  C) Temporal 109 16 99 % 70.6 kg (155 lb 10.3 oz)     Physical Exam  General: Resting comfortably.  Alert.   Head:  The scalp, face, and head appear normal   Eyes:  The pupils are equal, round, and reactive to light     Extraocular muscles are intact    Mild conjunctival injection noted to the left eye.  Right eye appears grossly normal.    There is a small, linear corneal abrasion noted to the middle of the left cornea overlying the mid pupil.     Visual acuity: Right-20/20, left-20/60    Lids were everted and not FB noted.   ENT:    The oropharynx is  normal    Uvula is in the midline    CV:  Regular rate and rhythm     Normal S1/S2  Resp:  Lungs are clear to auscultation    Non-labored    No rales or wheezing   MS:  Normal muscular tone   Skin:  No rash or acute skin lesions noted   Neuro: Speech is normal and fluent.     Emergency Department Course     Interventions:  1851 Alcaine 2 Drops   1911 Iwzsdz-rj-Dbs 1 Strip    Emergency Department Course:  Past medical records, nursing notes, and vitals reviewed.    1848: I performed an exam of the patient as documented above.   1910: Patient rechecked and updated.      Findings and plan explained to the Patient. Patient discharged home with instructions regarding supportive care, medications, and reasons to return. The importance of close follow-up was reviewed. The patient was prescribed Romycin.     I personally answered all related questions prior to discharge.      Impression & Plan     Medical Decision Making:  Caleb Talley is a 34 year old female presents for evaluation left eye pain.  A baby scratched her eye just prior to arrival, prompting her evaluation.  The patient symptoms improved after administration of proparacaine drops.  Visual acuity is mildly affected and is 20/60 versus 20/20 in the unaffected eye. The exam is significant for abnormality on fluorescein exam. This is consistent with corneal abrasion. The lids were everted and no foreign bodies in eyes or lids noted.  No corneal ulcers. No signs of open globe, acute glaucoma, or other serious eye disease.  No definite signs of anterior chamber involvement at this time. She had complete resolution of symptoms after application of proparicaine drops. Tetanus is up to date. She does not wear contacts. She will be discharged home. He was prescribed erythromycin ointment. She was asked to take Tylenol and Ibuprofen for discomfort. She was asked to follow up with an ophthalmologist in 2 days for recheck. They will return immediately for fevers, vomiting,  increased pain, periorbital edema/erythema, visual changes or any other concerns. All questions were answered prior to discharge. The patient understands and agrees to this plan.     Discharge Diagnosis:    ICD-10-CM    1. Abrasion of left cornea, initial encounter S05.02XA      Disposition:  Discharged home.     Discharge Medications:  New Prescriptions    ERYTHROMYCIN (ROMYCIN) 5 MG/GM OPHTHALMIC OINTMENT    Place 0.5 inches Into the left eye 4 times daily       Scribe Disclosure:  I, Deepa Caceres, am serving as a scribe at 6:45 PM on 12/19/2019 to document services personally performed by Oumou Raman PA based on my observations and the provider's statements to me.     12/19/2019   [facility] EMERGENCY DEPARTMENT       Oumou Raman PA-C  12/19/19 8936

## 2020-02-12 ENCOUNTER — MEDICAL CORRESPONDENCE (OUTPATIENT)
Dept: HEALTH INFORMATION MANAGEMENT | Facility: CLINIC | Age: 35
End: 2020-02-12

## 2020-03-02 ENCOUNTER — TELEPHONE (OUTPATIENT)
Dept: FAMILY MEDICINE | Facility: CLINIC | Age: 35
End: 2020-03-02

## 2020-03-02 DIAGNOSIS — O99.011 ANEMIA DURING PREGNANCY IN FIRST TRIMESTER: ICD-10-CM

## 2020-03-02 RX ORDER — PRENATAL VIT/IRON FUM/FOLIC AC 27MG-0.8MG
1 TABLET ORAL DAILY
Qty: 100 TABLET | Refills: 3 | Status: SHIPPED | OUTPATIENT
Start: 2020-03-02 | End: 2020-06-22

## 2020-06-22 DIAGNOSIS — O99.011 ANEMIA DURING PREGNANCY IN FIRST TRIMESTER: ICD-10-CM

## 2020-06-22 RX ORDER — PRENATAL VIT/IRON FUM/FOLIC AC 27MG-0.8MG
1 TABLET ORAL DAILY
Qty: 100 TABLET | Refills: 3 | Status: SHIPPED | OUTPATIENT
Start: 2020-06-22 | End: 2020-09-23

## 2020-09-23 ENCOUNTER — TELEPHONE (OUTPATIENT)
Dept: FAMILY MEDICINE | Facility: CLINIC | Age: 35
End: 2020-09-23

## 2020-09-23 DIAGNOSIS — B37.31 YEAST INFECTION OF THE VAGINA: Primary | ICD-10-CM

## 2020-09-23 DIAGNOSIS — O99.011 ANEMIA DURING PREGNANCY IN FIRST TRIMESTER: ICD-10-CM

## 2020-09-23 RX ORDER — CHOLECALCIFEROL (VITAMIN D3) 50 MCG
1 TABLET ORAL DAILY
Qty: 100 TABLET | Refills: 3 | Status: SHIPPED | OUTPATIENT
Start: 2020-09-23 | End: 2021-06-17

## 2020-09-23 RX ORDER — MICONAZOLE NITRATE 1200MG-2%
1 KIT VAGINAL AT BEDTIME
Qty: 1 KIT | Refills: 0 | Status: SHIPPED | OUTPATIENT
Start: 2020-09-23 | End: 2020-09-26

## 2020-09-23 RX ORDER — PRENATAL VIT/IRON FUM/FOLIC AC 27MG-0.8MG
1 TABLET ORAL DAILY
Qty: 100 TABLET | Refills: 3 | Status: SHIPPED | OUTPATIENT
Start: 2020-09-23 | End: 2021-06-17

## 2020-10-07 ENCOUNTER — TELEPHONE (OUTPATIENT)
Dept: FAMILY MEDICINE | Facility: CLINIC | Age: 35
End: 2020-10-07

## 2020-10-07 DIAGNOSIS — R13.10 DYSPHAGIA, UNSPECIFIED TYPE: Primary | ICD-10-CM

## 2020-10-07 NOTE — TELEPHONE ENCOUNTER
Please call patient and may talk to her niece.   Baby is good weight now so [employer] would not cover higher calorie formula - just whole milk,    Niece explained he cannot take solid food.   They try to give it to him and he does not eat it.   He just gets formula.   He is 15 months so I want to have him seen by GI for consult soon

## 2020-10-07 NOTE — TELEPHONE ENCOUNTER
Closing this message as it is mother's chart and message is on child.  See child's chart.  [facility] RN

## 2021-06-17 ENCOUNTER — OFFICE VISIT (OUTPATIENT)
Dept: FAMILY MEDICINE | Facility: CLINIC | Age: 36
End: 2021-06-17
Payer: COMMERCIAL

## 2021-06-17 VITALS
DIASTOLIC BLOOD PRESSURE: 60 MMHG | OXYGEN SATURATION: 98 % | HEIGHT: 58 IN | TEMPERATURE: 97.9 F | RESPIRATION RATE: 16 BRPM | HEART RATE: 106 BPM | WEIGHT: 153 LBS | SYSTOLIC BLOOD PRESSURE: 92 MMHG | BODY MASS INDEX: 32.12 KG/M2

## 2021-06-17 DIAGNOSIS — N92.6 IRREGULAR MENSES: ICD-10-CM

## 2021-06-17 DIAGNOSIS — Z00.00 ROUTINE GENERAL MEDICAL EXAMINATION AT A HEALTH CARE FACILITY: Primary | ICD-10-CM

## 2021-06-17 LAB
BASOPHILS # BLD AUTO: 0 10E9/L (ref 0–0.2)
BASOPHILS NFR BLD AUTO: 0.2 %
DIFFERENTIAL METHOD BLD: ABNORMAL
EOSINOPHIL # BLD AUTO: 0.1 10E9/L (ref 0–0.7)
EOSINOPHIL NFR BLD AUTO: 1.2 %
ERYTHROCYTE [DISTWIDTH] IN BLOOD BY AUTOMATED COUNT: 12.4 % (ref 10–15)
HCT VFR BLD AUTO: 34.7 % (ref 35–47)
HGB BLD-MCNC: 11.2 G/DL (ref 11.7–15.7)
LYMPHOCYTES # BLD AUTO: 1.4 10E9/L (ref 0.8–5.3)
LYMPHOCYTES NFR BLD AUTO: 35 %
MCH RBC QN AUTO: 28.4 PG (ref 26.5–33)
MCHC RBC AUTO-ENTMCNC: 32.3 G/DL (ref 31.5–36.5)
MCV RBC AUTO: 88 FL (ref 78–100)
MONOCYTES # BLD AUTO: 0.3 10E9/L (ref 0–1.3)
MONOCYTES NFR BLD AUTO: 7.6 %
NEUTROPHILS # BLD AUTO: 2.3 10E9/L (ref 1.6–8.3)
NEUTROPHILS NFR BLD AUTO: 56 %
PLATELET # BLD AUTO: 191 10E9/L (ref 150–450)
RBC # BLD AUTO: 3.95 10E12/L (ref 3.8–5.2)
WBC # BLD AUTO: 4.1 10E9/L (ref 4–11)

## 2021-06-17 PROCEDURE — 99395 PREV VISIT EST AGE 18-39: CPT | Performed by: PHYSICIAN ASSISTANT

## 2021-06-17 PROCEDURE — 85025 COMPLETE CBC W/AUTO DIFF WBC: CPT | Performed by: PHYSICIAN ASSISTANT

## 2021-06-17 PROCEDURE — 84443 ASSAY THYROID STIM HORMONE: CPT | Performed by: PHYSICIAN ASSISTANT

## 2021-06-17 PROCEDURE — 36415 COLL VENOUS BLD VENIPUNCTURE: CPT | Performed by: PHYSICIAN ASSISTANT

## 2021-06-17 RX ORDER — PRENATAL VIT/IRON FUM/FOLIC AC 27MG-0.8MG
1 TABLET ORAL DAILY
Qty: 100 TABLET | Refills: 3 | Status: SHIPPED | OUTPATIENT
Start: 2021-06-17 | End: 2022-07-26

## 2021-06-17 RX ORDER — MULTIVIT WITH MINERALS/LUTEIN
250 TABLET ORAL DAILY
Qty: 100 TABLET | Refills: 3 | Status: SHIPPED | OUTPATIENT
Start: 2021-06-17 | End: 2023-06-06

## 2021-06-17 RX ORDER — CHOLECALCIFEROL (VITAMIN D3) 50 MCG
1 TABLET ORAL DAILY
Qty: 100 TABLET | Refills: 3 | Status: SHIPPED | OUTPATIENT
Start: 2021-06-17 | End: 2022-07-26

## 2021-06-17 ASSESSMENT — ENCOUNTER SYMPTOMS
CHILLS: 0
NAUSEA: 0
FREQUENCY: 0
HEMATOCHEZIA: 0
PALPITATIONS: 0
FEVER: 0
ABDOMINAL PAIN: 1
DIARRHEA: 0
DIZZINESS: 0
HEMATURIA: 0
DYSURIA: 0
CONSTIPATION: 0
NERVOUS/ANXIOUS: 0
WEAKNESS: 0
COUGH: 0
SHORTNESS OF BREATH: 0
HEADACHES: 0
JOINT SWELLING: 0
HEARTBURN: 0
PARESTHESIAS: 0
BREAST MASS: 0
MYALGIAS: 0
SORE THROAT: 0
EYE PAIN: 0
ARTHRALGIAS: 0

## 2021-06-17 ASSESSMENT — MIFFLIN-ST. JEOR: SCORE: 1273.75

## 2021-06-17 NOTE — PROGRESS NOTES
SUBJECTIVE:   CC: Caleb Talley is an 36 year old woman who presents for preventive health visit.       Patient has been advised of split billing requirements and indicates understanding: Yes  Healthy Habits:     Getting at least 3 servings of Calcium per day:  Yes    Bi-annual eye exam:  Yes    Dental care twice a year:  Yes    Sleep apnea or symptoms of sleep apnea:  None    Diet:  Regular (no restrictions)    Frequency of exercise:  4-5 days/week    Duration of exercise:  15-30 minutes    Taking medications regularly:  Not Applicable    Medication side effects:  Not applicable    PHQ-2 Total Score: 0    Additional concerns today:  Yes       Periods: Bleeding for 10 days but they only come every 6-8 weeks. No chance of pregnancy. She would like to have more children. Has 1 child currently. Her  lives in [facility].       Today's PHQ-2 Score:   PHQ-2 ( 1999 [employer]) 6/17/2021   Q1: Little interest or pleasure in doing things 0   Q2: Feeling down, depressed or hopeless 0   PHQ-2 Score 0   Q1: Little interest or pleasure in doing things Not at all   Q2: Feeling down, depressed or hopeless Not at all   PHQ-2 Score 0       Abuse: Current or Past (Physical, Sexual or Emotional) - No  Do you feel safe in your environment? Yes    Have you ever done Advance Care Planning? (For example, a Health Directive, POLST, or a discussion with a medical provider or your loved ones about your wishes): No, advance care planning information given to patient to review.  Patient declined advance care planning discussion at this time.    Social History     Tobacco Use     Smoking status: Never Smoker     Smokeless tobacco: Never Used   Substance Use Topics     Alcohol use: No     Frequency: Never     If you drink alcohol do you typically have >3 drinks per day or >7 drinks per week? No    Alcohol Use 6/17/2021   Prescreen: >3 drinks/day or >7 drinks/week? Not Applicable   Prescreen: >3 drinks/day or >7 drinks/week? -   No flowsheet  data found.    Reviewed orders with patient.  Reviewed health maintenance and updated orders accordingly - Yes  BP Readings from Last 3 Encounters:   06/17/21 92/60   12/19/19 124/88   09/04/19 125/82    Wt Readings from Last 3 Encounters:   06/17/21 69.4 kg (153 lb)   12/19/19 70.6 kg (155 lb 10.3 oz)   09/04/19 71.2 kg (157 lb)                  Patient Active Problem List   Diagnosis     Blood type A+     Rubella non-immune status, antepartum     Elevated glucose level     Anemia during pregnancy in first trimester     Prenatal care in third trimester     Indication for care in labor or delivery     Past Surgical History:   Procedure Laterality Date     NO HISTORY OF SURGERY         Social History     Tobacco Use     Smoking status: Never Smoker     Smokeless tobacco: Never Used   Substance Use Topics     Alcohol use: No     Frequency: Never     History reviewed. No pertinent family history.      Current Outpatient Medications   Medication Sig Dispense Refill     ibuprofen (ADVIL/MOTRIN) 600 MG tablet Take 1 tablet (600 mg) by mouth every 6 hours as needed for moderate pain 30 tablet 1     lanolin ointment Apply topically every hour as needed for dry skin (sore nipples)       ondansetron (ZOFRAN) 4 MG tablet Take 1 tablet (4 mg) by mouth every 8 hours as needed for nausea 30 tablet 1     Prenatal Vit-Fe Fumarate-FA (PRENATAL MULTIVITAMIN W/IRON) 27-0.8 MG tablet Take 1 tablet by mouth daily 100 tablet 3     vitamin D3 (CHOLECALCIFEROL) 50 mcg (2000 units) tablet Take 1 tablet (50 mcg) by mouth daily 100 tablet 3     No Known Allergies    Breast Cancer Screening:  Any new diagnosis of family breast, ovarian, or bowel cancer? No    FSH-7: No flowsheet data found.    Patient under 40 years of age: Routine Mammogram Screening not recommended.   Pertinent mammograms are reviewed under the imaging tab.    History of abnormal Pap smear: NO - age 30-65 PAP every 5 years with negative HPV co-testing recommended  PAP / HPV  Latest Ref Rng & Units 2019   PAP - NIL   HPV 16 DNA NEG:Negative Negative   HPV 18 DNA NEG:Negative Negative   OTHER HR HPV NEG:Negative Negative     Reviewed and updated as needed this visit by clinical staff  Tobacco  Allergies    Med Hx  Surg Hx  Fam Hx  Soc Hx        Reviewed and updated as needed this visit by Provider                Past Medical History:   Diagnosis Date     Blood type A+ 2019     Rubella non-immune status, antepartum 2019      Past Surgical History:   Procedure Laterality Date     NO HISTORY OF SURGERY       OB History    Para Term  AB Living   1 1 1 0 0 1   SAB TAB Ectopic Multiple Live Births   0 0 0 0 1      # Outcome Date GA Lbr Ben/2nd Weight Sex Delivery Anes PTL Lv   1 Term 19 39w4d / 01:09 2.535 kg (5 lb 9.4 oz) M Vag-Spont Nitrous N MARTHA      Name: ANA MOROCHO-EVELYN      Apgar1: 6  Apgar5: 7       Review of Systems   Constitutional: Negative for chills and fever.   HENT: Negative for congestion, ear pain, hearing loss and sore throat.    Eyes: Negative for pain and visual disturbance.   Respiratory: Negative for cough and shortness of breath.    Cardiovascular: Negative for chest pain, palpitations and peripheral edema.   Gastrointestinal: Positive for abdominal pain. Negative for constipation, diarrhea, heartburn, hematochezia and nausea.   Breasts:  Negative for tenderness, breast mass and discharge.   Genitourinary: Negative for dysuria, frequency, genital sores, hematuria, pelvic pain, urgency, vaginal bleeding and vaginal discharge.   Musculoskeletal: Negative for arthralgias, joint swelling and myalgias.   Skin: Negative for rash.   Neurological: Negative for dizziness, weakness, headaches and paresthesias.   Psychiatric/Behavioral: Negative for mood changes. The patient is not nervous/anxious.      CONSTITUTIONAL: NEGATIVE for fever, chills, change in weight  INTEGUMENTARU/SKIN: NEGATIVE for worrisome rashes, moles or lesions  EYES:  "NEGATIVE for vision changes or irritation  ENT: NEGATIVE for ear, mouth and throat problems  RESP: NEGATIVE for significant cough or SOB  BREAST: NEGATIVE for masses, tenderness or discharge  CV: NEGATIVE for chest pain, palpitations or peripheral edema  GI: NEGATIVE for nausea, abdominal pain, heartburn, or change in bowel habits  : NEGATIVE for unusual urinary or vaginal symptoms. Periods are regular.  MUSCULOSKELETAL: NEGATIVE for significant arthralgias or myalgia  NEURO: NEGATIVE for weakness, dizziness or paresthesias  PSYCHIATRIC: NEGATIVE for changes in mood or affect     OBJECTIVE:   BP 92/60 (BP Location: Right arm, Patient Position: Chair, Cuff Size: Adult Regular)   Pulse 106   Temp 97.9  F (36.6  C) (Oral)   Resp 16   Ht 1.473 m (4' 10\")   Wt 69.4 kg (153 lb)   LMP 05/05/2021   SpO2 98%   BMI 31.98 kg/m         Physical Exam  GENERAL: healthy, alert and no distress  EYES: Eyes grossly normal to inspection, PERRL and conjunctivae and sclerae normal  HENT: ear canals and TM's normal, nose and mouth without ulcers or lesions  NECK: no adenopathy, no asymmetry, masses, or scars and thyroid normal to palpation  RESP: lungs clear to auscultation - no rales, rhonchi or wheezes  CV: regular rate and rhythm, normal S1 S2, no S3 or S4, no murmur, click or rub, no peripheral edema and peripheral pulses strong  ABDOMEN: soft, nontender, no hepatosplenomegaly, no masses and bowel sounds normal  MS: no gross musculoskeletal defects noted, no edema  SKIN: no suspicious lesions or rashes  NEURO: Normal strength and tone, mentation intact and speech normal  PSYCH: mentation appears normal, affect normal/bright    Diagnostic Test Results:  Labs reviewed in Epic    ASSESSMENT/PLAN:   (Z00.00) Routine general medical examination at a health care facility  (primary encounter diagnosis)    Comment: Normal exam. Refilled vitamin prescriptions as patient is going to try getting pregnant again next month.    Plan: CBC " "with platelets differential, TSH with free         T4 reflex, Prenatal Vit-Fe Fumarate-FA         (PRENATAL MULTIVITAMIN W/IRON) 27-0.8 MG         tablet, vitamin D3 (CHOLECALCIFEROL) 50 mcg         (2000 units) tablet, vitamin C (ASCORBIC ACID)         250 MG tablet            (N92.6) Irregular menses    Comment: Discussed with patient that hormonal birth control methods could be used to control menses. She is not interested at this time.        Patient has been advised of split billing requirements and indicates understanding: Yes  COUNSELING:  Reviewed preventive health counseling, as reflected in patient instructions       Regular exercise       Healthy diet/nutrition       Family planning    Estimated body mass index is 31.98 kg/m  as calculated from the following:    Height as of this encounter: 1.473 m (4' 10\").    Weight as of this encounter: 69.4 kg (153 lb).    Weight management plan: Discussed healthy diet and exercise guidelines    She reports that she has never smoked. She has never used smokeless tobacco.      Counseling Resources:  ATP IV Guidelines  Pooled Cohorts Equation Calculator  Breast Cancer Risk Calculator  BRCA-Related Cancer Risk Assessment: FHS-7 Tool  FRAX Risk Assessment  ICSI Preventive Guidelines  Dietary Guidelines for Americans, 2010  USDA's MyPlate  ASA Prophylaxis  Lung CA Screening    Dudley Rosenberg PA-C  [facility]  "

## 2021-06-18 LAB — TSH SERPL DL<=0.005 MIU/L-ACNC: 2.31 MU/L (ref 0.4–4)

## 2021-08-28 ENCOUNTER — HOSPITAL ENCOUNTER (EMERGENCY)
Facility: CLINIC | Age: 36
End: 2021-08-28
Payer: COMMERCIAL

## 2022-02-09 ENCOUNTER — OFFICE VISIT (OUTPATIENT)
Dept: FAMILY MEDICINE | Facility: CLINIC | Age: 37
End: 2022-02-09
Payer: COMMERCIAL

## 2022-02-09 VITALS
DIASTOLIC BLOOD PRESSURE: 68 MMHG | OXYGEN SATURATION: 97 % | WEIGHT: 161 LBS | SYSTOLIC BLOOD PRESSURE: 102 MMHG | BODY MASS INDEX: 33.65 KG/M2 | TEMPERATURE: 98.6 F | HEART RATE: 110 BPM

## 2022-02-09 DIAGNOSIS — B37.31 YEAST INFECTION OF THE VAGINA: ICD-10-CM

## 2022-02-09 DIAGNOSIS — N91.2 AMENORRHEA: ICD-10-CM

## 2022-02-09 DIAGNOSIS — R30.0 DYSURIA: ICD-10-CM

## 2022-02-09 DIAGNOSIS — N89.8 VAGINAL ITCHING: Primary | ICD-10-CM

## 2022-02-09 DIAGNOSIS — N39.0 URINARY TRACT INFECTION WITHOUT HEMATURIA, SITE UNSPECIFIED: ICD-10-CM

## 2022-02-09 PROBLEM — Z34.93 PRENATAL CARE IN THIRD TRIMESTER: Status: RESOLVED | Noted: 2019-04-10 | Resolved: 2022-02-09

## 2022-02-09 LAB
ALBUMIN UR-MCNC: NEGATIVE MG/DL
APPEARANCE UR: CLEAR
BACTERIA #/AREA URNS HPF: ABNORMAL /HPF
BILIRUB UR QL STRIP: NEGATIVE
CLUE CELLS: ABNORMAL
COLOR UR AUTO: YELLOW
GLUCOSE UR STRIP-MCNC: NEGATIVE MG/DL
HCG UR QL: NEGATIVE
HGB UR QL STRIP: NEGATIVE
KETONES UR STRIP-MCNC: NEGATIVE MG/DL
LEUKOCYTE ESTERASE UR QL STRIP: ABNORMAL
NITRATE UR QL: NEGATIVE
PH UR STRIP: 6 [PH] (ref 5–7)
RBC #/AREA URNS AUTO: ABNORMAL /HPF
SP GR UR STRIP: <=1.005 (ref 1–1.03)
SQUAMOUS #/AREA URNS AUTO: ABNORMAL /LPF
TRICHOMONAS, WET PREP: ABNORMAL
UROBILINOGEN UR STRIP-ACNC: 0.2 E.U./DL
WBC #/AREA URNS AUTO: ABNORMAL /HPF
WBC'S/HIGH POWER FIELD, WET PREP: ABNORMAL
YEAST, WET PREP: PRESENT

## 2022-02-09 PROCEDURE — 87086 URINE CULTURE/COLONY COUNT: CPT | Performed by: FAMILY MEDICINE

## 2022-02-09 PROCEDURE — 81001 URINALYSIS AUTO W/SCOPE: CPT | Performed by: FAMILY MEDICINE

## 2022-02-09 PROCEDURE — 99213 OFFICE O/P EST LOW 20 MIN: CPT | Performed by: FAMILY MEDICINE

## 2022-02-09 PROCEDURE — 81025 URINE PREGNANCY TEST: CPT | Performed by: FAMILY MEDICINE

## 2022-02-09 PROCEDURE — 87186 SC STD MICRODIL/AGAR DIL: CPT | Performed by: FAMILY MEDICINE

## 2022-02-09 PROCEDURE — 87210 SMEAR WET MOUNT SALINE/INK: CPT | Performed by: FAMILY MEDICINE

## 2022-02-09 RX ORDER — NITROFURANTOIN 25; 75 MG/1; MG/1
100 CAPSULE ORAL 2 TIMES DAILY
Qty: 10 CAPSULE | Refills: 0 | Status: SHIPPED | OUTPATIENT
Start: 2022-02-09 | End: 2022-02-14

## 2022-02-09 RX ORDER — FLUCONAZOLE 150 MG/1
150 TABLET ORAL ONCE
Qty: 1 TABLET | Refills: 1 | Status: SHIPPED | OUTPATIENT
Start: 2022-02-09 | End: 2022-02-09

## 2022-02-09 NOTE — PROGRESS NOTES
"  Assessment & Plan     Vaginal itching  - Wet preparation; Future  - Wet preparation    Dysuria  - UA Macro with Reflex to Micro and Culture - lab collect; Future  - UA Macro with Reflex to Micro and Culture - lab collect  - Urine Microscopic Exam  - Urine Culture    Yeast infection of the vagina  Positive for infection, Diflucan sent.  Follow up as needed.  - fluconazole (DIFLUCAN) 150 MG tablet; Take 1 tablet (150 mg) by mouth once for 1 dose    Urinary tract infection without hematuria, site unspecified  UTI, culture pending.  Will adjust antibiotics as needed pending results.  - nitroFURantoin macrocrystal-monohydrate (MACROBID) 100 MG capsule; Take 1 capsule (100 mg) by mouth 2 times daily for 5 days    Amenorrhea  Negative  - HCG Qual, Urine ([id number])    15 minutes spent on the date of the encounter doing chart review, history and exam, documentation and further activities per the note       BMI:   Estimated body mass index is 33.65 kg/m  as calculated from the following:    Height as of 6/17/21: 1.473 m (4' 10\").    Weight as of this encounter: 73 kg (161 lb).       See Patient Instructions    No follow-ups on file.    Marion Dorado MD  [facility] [place]    Subjective   Caleb is a 37 year old who presents for the following health issues  accompanied by her friend.    UTI    History of Present Illness       She eats 2-3 servings of fruits and vegetables daily.She consumes 1 sweetened beverage(s) daily.She exercises with enough effort to increase her heart rate 9 or less minutes per day.  She exercises with enough effort to increase her heart rate 3 or less days per week.   She is taking medications regularly.       Vaginal Symptoms  Onset/Duration: 2 months   Description:  Vaginal Discharge: none   Itching (Pruritis): YES  Burning sensation:  YES  Odor: no  Accompanying Signs & Symptoms:  Urinary symptoms: YES- feels like she has to urinate again but no out put   Abdominal pain: " YES- cramps  Fever: no  History:   Sexually active: YES  New Partner: no  Possibility of Pregnancy:  unknow would like upt   Recent antibiotic use: YES- went to clinic back home pt had yeast Infection   Previous vaginitis issues: no  Precipitating or alleviating factors: None  Therapies tried and outcome: none    Patient reports she was treated for a yeast infection in the past.  Believes she has a UTI.      Review of Systems   Constitutional, HEENT, cardiovascular, pulmonary, gi and gu systems are negative, except as otherwise noted.      Objective    /68   Pulse 110   Temp 98.6  F (37  C) (Oral)   Wt 73 kg (161 lb)   LMP 01/22/2022   SpO2 97%   BMI 33.65 kg/m    Body mass index is 33.65 kg/m .  Physical Exam   GENERAL: healthy, alert and no distress  PSYCH: mentation appears normal, affect normal/bright    Results for orders placed or performed in visit on 02/09/22 (from the past 24 hour(s))   Wet preparation    Specimen: Vagina; Swab   Result Value Ref Range    Trichomonas Absent Absent    Yeast Present (A) Absent    Clue Cells Absent Absent    WBCs/high power field 2+ (A) None   UA Macro with Reflex to Micro and Culture - lab collect    Specimen: Urine, Midstream   Result Value Ref Range    Color Urine Yellow Colorless, Straw, Light Yellow, Yellow    Appearance Urine Clear Clear    Glucose Urine Negative Negative mg/dL    Bilirubin Urine Negative Negative    Ketones Urine Negative Negative mg/dL    Specific Gravity Urine <=1.005 1.003 - 1.035    Blood Urine Negative Negative    pH Urine 6.0 5.0 - 7.0    Protein Albumin Urine Negative Negative mg/dL    Urobilinogen Urine 0.2 0.2, 1.0 E.U./dL    Nitrite Urine Negative Negative    Leukocyte Esterase Urine Moderate (A) Negative   HCG Qual, Urine ([id number])   Result Value Ref Range    hCG Urine Qualitative Negative Negative   Urine Microscopic Exam   Result Value Ref Range    Bacteria Urine Few (A) None Seen /HPF    RBC Urine 0-2 0-2 /HPF /HPF    WBC Urine  10-25 (A) 0-5 /HPF /HPF    Squamous Epithelials Urine Few (A) None Seen /LPF

## 2022-02-11 LAB — BACTERIA UR CULT: ABNORMAL

## 2022-02-14 DIAGNOSIS — B96.29 UTI DUE TO EXTENDED-SPECTRUM BETA LACTAMASE (ESBL) PRODUCING ESCHERICHIA COLI: Primary | ICD-10-CM

## 2022-02-14 DIAGNOSIS — N39.0 UTI DUE TO EXTENDED-SPECTRUM BETA LACTAMASE (ESBL) PRODUCING ESCHERICHIA COLI: Primary | ICD-10-CM

## 2022-02-14 DIAGNOSIS — Z16.12 UTI DUE TO EXTENDED-SPECTRUM BETA LACTAMASE (ESBL) PRODUCING ESCHERICHIA COLI: Primary | ICD-10-CM

## 2022-02-14 NOTE — TELEPHONE ENCOUNTER
Notified of results and provider message. Patient is not doing better. Continues to have dysuria and would like a different antibiotic.  Sunitha Daley RN

## 2022-02-14 NOTE — TELEPHONE ENCOUNTER
Please call patient to find out if she is breastfeeding still.  Lactation warning comes up when trying to prescribe Fosfomycin.  Fosfomycin is a single dose antibiotic used in resistant infections.      Patient has a multi-drug resistant bacteria (E. Coli) in her urine causing the infection.

## 2022-02-16 RX ORDER — GRANULES FOR ORAL 3 G/1
3 POWDER ORAL ONCE
Qty: 1 PACKET | Refills: 0 | Status: SHIPPED | OUTPATIENT
Start: 2022-02-16 | End: 2022-02-16

## 2022-02-16 NOTE — TELEPHONE ENCOUNTER
Called and spoke with patient via . Patient is not breastfeeding. Stopped in 2019. Patient is still experiencing urinary symptoms (burning with urination). Please review and advise.     Helio LAUGHLIN RN

## 2022-04-06 ENCOUNTER — OFFICE VISIT (OUTPATIENT)
Dept: FAMILY MEDICINE | Facility: CLINIC | Age: 37
End: 2022-04-06
Payer: COMMERCIAL

## 2022-04-06 VITALS
TEMPERATURE: 97.9 F | WEIGHT: 169.8 LBS | OXYGEN SATURATION: 100 % | HEART RATE: 93 BPM | SYSTOLIC BLOOD PRESSURE: 100 MMHG | HEIGHT: 58 IN | RESPIRATION RATE: 13 BRPM | BODY MASS INDEX: 35.64 KG/M2 | DIASTOLIC BLOOD PRESSURE: 64 MMHG

## 2022-04-06 DIAGNOSIS — E66.01 MORBID OBESITY (H): ICD-10-CM

## 2022-04-06 DIAGNOSIS — N91.2 AMENORRHEA: ICD-10-CM

## 2022-04-06 DIAGNOSIS — Z11.59 NEED FOR HEPATITIS C SCREENING TEST: Primary | ICD-10-CM

## 2022-04-06 DIAGNOSIS — Z13.6 CARDIOVASCULAR SCREENING; LDL GOAL LESS THAN 130: ICD-10-CM

## 2022-04-06 LAB
ALBUMIN SERPL-MCNC: 3.5 G/DL (ref 3.4–5)
ALP SERPL-CCNC: 71 U/L (ref 40–150)
ALT SERPL W P-5'-P-CCNC: 16 U/L (ref 0–50)
ANION GAP SERPL CALCULATED.3IONS-SCNC: 6 MMOL/L (ref 3–14)
AST SERPL W P-5'-P-CCNC: 14 U/L (ref 0–45)
BILIRUB SERPL-MCNC: 0.1 MG/DL (ref 0.2–1.3)
BUN SERPL-MCNC: 14 MG/DL (ref 7–30)
CALCIUM SERPL-MCNC: 9.4 MG/DL (ref 8.5–10.1)
CHLORIDE BLD-SCNC: 107 MMOL/L (ref 94–109)
CHOLEST SERPL-MCNC: 233 MG/DL
CO2 SERPL-SCNC: 25 MMOL/L (ref 20–32)
CREAT SERPL-MCNC: 0.59 MG/DL (ref 0.52–1.04)
ERYTHROCYTE [DISTWIDTH] IN BLOOD BY AUTOMATED COUNT: 14.4 % (ref 10–15)
FASTING STATUS PATIENT QL REPORTED: ABNORMAL
GFR SERPL CREATININE-BSD FRML MDRD: >90 ML/MIN/1.73M2
GLUCOSE BLD-MCNC: 113 MG/DL (ref 70–99)
HCG SERPL QL: NEGATIVE
HCG UR QL: NEGATIVE
HCT VFR BLD AUTO: 37.5 % (ref 35–47)
HDLC SERPL-MCNC: 56 MG/DL
HGB BLD-MCNC: 11.9 G/DL (ref 11.7–15.7)
LDLC SERPL CALC-MCNC: 164 MG/DL
MCH RBC QN AUTO: 27.2 PG (ref 26.5–33)
MCHC RBC AUTO-ENTMCNC: 31.7 G/DL (ref 31.5–36.5)
MCV RBC AUTO: 86 FL (ref 78–100)
NONHDLC SERPL-MCNC: 177 MG/DL
PLATELET # BLD AUTO: 192 10E3/UL (ref 150–450)
POTASSIUM BLD-SCNC: 3.8 MMOL/L (ref 3.4–5.3)
PROT SERPL-MCNC: 8.3 G/DL (ref 6.8–8.8)
RBC # BLD AUTO: 4.37 10E6/UL (ref 3.8–5.2)
SODIUM SERPL-SCNC: 138 MMOL/L (ref 133–144)
TRIGL SERPL-MCNC: 67 MG/DL
TSH SERPL DL<=0.005 MIU/L-ACNC: 3.94 MU/L (ref 0.4–4)
WBC # BLD AUTO: 4.4 10E3/UL (ref 4–11)

## 2022-04-06 PROCEDURE — 81025 URINE PREGNANCY TEST: CPT | Performed by: FAMILY MEDICINE

## 2022-04-06 PROCEDURE — 84443 ASSAY THYROID STIM HORMONE: CPT | Performed by: FAMILY MEDICINE

## 2022-04-06 PROCEDURE — 99213 OFFICE O/P EST LOW 20 MIN: CPT | Performed by: FAMILY MEDICINE

## 2022-04-06 PROCEDURE — 85027 COMPLETE CBC AUTOMATED: CPT | Performed by: FAMILY MEDICINE

## 2022-04-06 PROCEDURE — 80061 LIPID PANEL: CPT | Performed by: FAMILY MEDICINE

## 2022-04-06 PROCEDURE — 36415 COLL VENOUS BLD VENIPUNCTURE: CPT | Performed by: FAMILY MEDICINE

## 2022-04-06 PROCEDURE — 86803 HEPATITIS C AB TEST: CPT | Performed by: FAMILY MEDICINE

## 2022-04-06 PROCEDURE — 84703 CHORIONIC GONADOTROPIN ASSAY: CPT | Performed by: FAMILY MEDICINE

## 2022-04-06 PROCEDURE — 80053 COMPREHEN METABOLIC PANEL: CPT | Performed by: FAMILY MEDICINE

## 2022-04-06 RX ORDER — NORGESTREL-ETHINYL ESTRADIOL 0.3-0.03MG
TABLET ORAL
COMMUNITY
Start: 2021-08-05 | End: 2022-07-26

## 2022-04-06 NOTE — PROGRESS NOTES
"  Assessment & Plan     Need for hepatitis C screening test    - Hepatitis C Screen Reflex to HCV RNA Quant and Genotype    Morbid obesity (H)  stabel    Amenorrhea  labs  - REVIEW OF HEALTH MAINTENANCE PROTOCOL ORDERS  - HCG Qual, Urine ([id number])  - TSH with free T4 reflex  - HCG qualitative  - Comprehensive metabolic panel (BMP + Alb, Alk Phos, ALT, AST, Total. Bili, TP)  - CBC with platelets    CARDIOVASCULAR SCREENING; LDL GOAL LESS THAN 130    - Lipid panel reflex to direct LDL Non-fasting        12 minutes spent on the date of the encounter doing chart review, review of test results, patient visit, documentation and discussion with family        BMI:   Estimated body mass index is 35.49 kg/m  as calculated from the following:    Height as of this encounter: 1.473 m (4' 10\").    Weight as of this encounter: 77 kg (169 lb 12.8 oz).           Return in about 2 months (around 6/6/2022) for Physical Exam.    Rin Hoffmann MD  [facility] TYLER Ellis is a 37 year old who presents for the following health issues  - she is here     HPI     Last period was 2 months ago, has 2 negative home tests and one at the clinic. Complains of are swelling and back pain.   Her breasts are tender and last pregnancy the urine test was NEG and then the blood test was POS.   She is trying to conceive.   She is NOT breastfeeding.     Past Medical History:   Diagnosis Date     Blood type A+ 1/18/2019     Rubella non-immune status, antepartum 1/18/2019       Past Surgical History:   Procedure Laterality Date     NO HISTORY OF SURGERY         MEDICATIONS:  Current Outpatient Medications   Medication     CRYSELLE-28 0.3-30 MG-MCG tablet     ibuprofen (ADVIL/MOTRIN) 600 MG tablet     lanolin ointment     ondansetron (ZOFRAN) 4 MG tablet     Prenatal Vit-Fe Fumarate-FA (PRENATAL MULTIVITAMIN W/IRON) 27-0.8 MG tablet     vitamin C (ASCORBIC ACID) 250 MG tablet     vitamin D3 (CHOLECALCIFEROL) 50 mcg (2000 " "units) tablet     No current facility-administered medications for this visit.       SOCIAL HISTORY:  Social History     Tobacco Use     Smoking status: Never Smoker     Smokeless tobacco: Never Used   Substance Use Topics     Alcohol use: No       History reviewed. No pertinent family history.      Review of Systems   Constitutional, HEENT, cardiovascular, pulmonary, gi and gu systems are negative, except as otherwise noted.      Objective    /64 (BP Location: Right arm, Patient Position: Sitting, Cuff Size: Adult Regular)   Pulse 93   Temp 97.9  F (36.6  C) (Oral)   Resp 13   Ht 1.473 m (4' 10\")   Wt 77 kg (169 lb 12.8 oz)   LMP 01/23/2022 (Exact Date)   SpO2 100%   BMI 35.49 kg/m    Body mass index is 35.49 kg/m .  Physical Exam   GENERAL: alert, no distress and over weight  EYES: Eyes grossly normal to inspection, PERRL and conjunctivae and sclerae normal  HENT: ear canals and TM's normal, nose and mouth without ulcers or lesions  NECK: no adenopathy, no asymmetry, masses, or scars and thyroid normal to palpation  RESP: lungs clear to auscultation - no rales, rhonchi or wheezes  CV: regular rate and rhythm, normal S1 S2, no S3 or S4, no murmur, click or rub, no peripheral edema and peripheral pulses strong  ABDOMEN: soft, nontender, no hepatosplenomegaly, no masses and bowel sounds normal  MS: no gross musculoskeletal defects noted, no edema  SKIN: no suspicious lesions or rashes  NEURO: Normal strength and tone, mentation intact and speech normal  PSYCH: mentation appears normal, affect normal/bright  LYMPH: no cervical, supraclavicular, axillary, or inguinal adenopathy    Office Visit on 02/09/2022   Component Date Value Ref Range Status     Trichomonas 02/09/2022 Absent  Absent Final     Yeast 02/09/2022 Present (A) Absent Final     Clue Cells 02/09/2022 Absent  Absent Final     WBCs/high power field 02/09/2022 2+ (A) None Final     Color Urine 02/09/2022 Yellow  Colorless, Straw, Light Yellow, " Yellow Final     Appearance Urine 02/09/2022 Clear  Clear Final     Glucose Urine 02/09/2022 Negative  Negative mg/dL Final     Bilirubin Urine 02/09/2022 Negative  Negative Final     Ketones Urine 02/09/2022 Negative  Negative mg/dL Final     Specific Gravity Urine 02/09/2022 <=1.005  1.003 - 1.035 Final     Blood Urine 02/09/2022 Negative  Negative Final     pH Urine 02/09/2022 6.0  5.0 - 7.0 Final     Protein Albumin Urine 02/09/2022 Negative  Negative mg/dL Final     Urobilinogen Urine 02/09/2022 0.2  0.2, 1.0 E.U./dL Final     Nitrite Urine 02/09/2022 Negative  Negative Final     Leukocyte Esterase Urine 02/09/2022 Moderate (A) Negative Final     hCG Urine Qualitative 02/09/2022 Negative  Negative Final    This test is for screening purposes.  Results should be interpreted along with the clinical picture.  Confirmation testing is available if warranted by ordering [id number], HCG Quantitative Pregnancy.     Bacteria Urine 02/09/2022 Few (A) None Seen /HPF Final     RBC Urine 02/09/2022 0-2  0-2 /HPF /HPF Final     WBC Urine 02/09/2022 10-25 (A) 0-5 /HPF /HPF Final     Squamous Epithelials Urine 02/09/2022 Few (A) None Seen /LPF Final     Culture 02/09/2022 10,000-50,000 CFU/mL Escherichia coli ESBL (A)  Final

## 2022-04-07 LAB — HCV AB SERPL QL IA: NONREACTIVE

## 2022-07-26 ENCOUNTER — OFFICE VISIT (OUTPATIENT)
Dept: FAMILY MEDICINE | Facility: CLINIC | Age: 37
End: 2022-07-26
Payer: COMMERCIAL

## 2022-07-26 VITALS
HEIGHT: 58 IN | RESPIRATION RATE: 18 BRPM | TEMPERATURE: 98.1 F | BODY MASS INDEX: 37.22 KG/M2 | OXYGEN SATURATION: 98 % | WEIGHT: 177.3 LBS | HEART RATE: 88 BPM | DIASTOLIC BLOOD PRESSURE: 72 MMHG | SYSTOLIC BLOOD PRESSURE: 101 MMHG

## 2022-07-26 DIAGNOSIS — N97.9 SECONDARY FEMALE INFERTILITY: ICD-10-CM

## 2022-07-26 DIAGNOSIS — B37.31 YEAST INFECTION OF THE VAGINA: ICD-10-CM

## 2022-07-26 DIAGNOSIS — Z00.00 ROUTINE HISTORY AND PHYSICAL EXAMINATION OF ADULT: Primary | ICD-10-CM

## 2022-07-26 DIAGNOSIS — Z00.00 ROUTINE GENERAL MEDICAL EXAMINATION AT A HEALTH CARE FACILITY: ICD-10-CM

## 2022-07-26 PROCEDURE — 99395 PREV VISIT EST AGE 18-39: CPT | Performed by: FAMILY MEDICINE

## 2022-07-26 RX ORDER — FLUCONAZOLE 150 MG/1
150 TABLET ORAL EVERY OTHER DAY
Qty: 3 TABLET | Refills: 0 | Status: SHIPPED | OUTPATIENT
Start: 2022-07-26 | End: 2022-07-31

## 2022-07-26 RX ORDER — BENZOCAINE/MENTHOL 6 MG-10 MG
LOZENGE MUCOUS MEMBRANE 2 TIMES DAILY PRN
Qty: 30 G | Refills: 1 | Status: SHIPPED | OUTPATIENT
Start: 2022-07-26 | End: 2023-06-06

## 2022-07-26 RX ORDER — PRENATAL VIT/IRON FUM/FOLIC AC 27MG-0.8MG
1 TABLET ORAL DAILY
Qty: 100 TABLET | Refills: 3 | Status: SHIPPED | OUTPATIENT
Start: 2022-07-26 | End: 2023-05-02

## 2022-07-26 RX ORDER — MULTIVIT WITH MINERALS/LUTEIN
1 TABLET ORAL
COMMUNITY
End: 2022-07-26

## 2022-07-26 RX ORDER — CHOLECALCIFEROL (VITAMIN D3) 50 MCG
1 TABLET ORAL DAILY
Qty: 100 TABLET | Refills: 3 | Status: SHIPPED | OUTPATIENT
Start: 2022-07-26 | End: 2023-06-06

## 2022-07-26 SDOH — ECONOMIC STABILITY: TRANSPORTATION INSECURITY
IN THE PAST 12 MONTHS, HAS THE LACK OF TRANSPORTATION KEPT YOU FROM MEDICAL APPOINTMENTS OR FROM GETTING MEDICATIONS?: NO

## 2022-07-26 SDOH — HEALTH STABILITY: PHYSICAL HEALTH: ON AVERAGE, HOW MANY MINUTES DO YOU ENGAGE IN EXERCISE AT THIS LEVEL?: 50 MIN

## 2022-07-26 SDOH — ECONOMIC STABILITY: TRANSPORTATION INSECURITY
IN THE PAST 12 MONTHS, HAS LACK OF TRANSPORTATION KEPT YOU FROM MEETINGS, WORK, OR FROM GETTING THINGS NEEDED FOR DAILY LIVING?: NO

## 2022-07-26 SDOH — ECONOMIC STABILITY: FOOD INSECURITY: WITHIN THE PAST 12 MONTHS, YOU WORRIED THAT YOUR FOOD WOULD RUN OUT BEFORE YOU GOT MONEY TO BUY MORE.: NEVER TRUE

## 2022-07-26 SDOH — HEALTH STABILITY: PHYSICAL HEALTH: ON AVERAGE, HOW MANY DAYS PER WEEK DO YOU ENGAGE IN MODERATE TO STRENUOUS EXERCISE (LIKE A BRISK WALK)?: 6 DAYS

## 2022-07-26 SDOH — ECONOMIC STABILITY: INCOME INSECURITY: HOW HARD IS IT FOR YOU TO PAY FOR THE VERY BASICS LIKE FOOD, HOUSING, MEDICAL CARE, AND HEATING?: NOT HARD AT ALL

## 2022-07-26 SDOH — ECONOMIC STABILITY: FOOD INSECURITY: WITHIN THE PAST 12 MONTHS, THE FOOD YOU BOUGHT JUST DIDN'T LAST AND YOU DIDN'T HAVE MONEY TO GET MORE.: NEVER TRUE

## 2022-07-26 SDOH — ECONOMIC STABILITY: INCOME INSECURITY: IN THE LAST 12 MONTHS, WAS THERE A TIME WHEN YOU WERE NOT ABLE TO PAY THE MORTGAGE OR RENT ON TIME?: NO

## 2022-07-26 ASSESSMENT — ENCOUNTER SYMPTOMS
SHORTNESS OF BREATH: 0
JOINT SWELLING: 0
BREAST MASS: 0
FEVER: 0
NAUSEA: 0
DYSURIA: 0
CHILLS: 0
ABDOMINAL PAIN: 0
EYE PAIN: 0
PALPITATIONS: 0
NERVOUS/ANXIOUS: 0
SORE THROAT: 0
MYALGIAS: 0
HEMATOCHEZIA: 0
WEAKNESS: 0
HEADACHES: 0
HEARTBURN: 0
CONSTIPATION: 0
DIZZINESS: 0
COUGH: 0
FREQUENCY: 0
PARESTHESIAS: 0
ARTHRALGIAS: 0
DIARRHEA: 0
HEMATURIA: 0

## 2022-07-26 ASSESSMENT — SOCIAL DETERMINANTS OF HEALTH (SDOH)
IN A TYPICAL WEEK, HOW MANY TIMES DO YOU TALK ON THE PHONE WITH FAMILY, FRIENDS, OR NEIGHBORS?: ONCE A WEEK
DO YOU BELONG TO ANY CLUBS OR ORGANIZATIONS SUCH AS CHURCH GROUPS UNIONS, FRATERNAL OR ATHLETIC GROUPS, OR SCHOOL GROUPS?: NO
HOW OFTEN DO YOU GET TOGETHER WITH FRIENDS OR RELATIVES?: ONCE A WEEK
HOW OFTEN DO YOU ATTEND CHURCH OR RELIGIOUS SERVICES?: NEVER

## 2022-07-26 ASSESSMENT — LIFESTYLE VARIABLES
HOW MANY STANDARD DRINKS CONTAINING ALCOHOL DO YOU HAVE ON A TYPICAL DAY: PATIENT DOES NOT DRINK
SKIP TO QUESTIONS 9-10: 1
HOW OFTEN DO YOU HAVE A DRINK CONTAINING ALCOHOL: NEVER
HOW OFTEN DO YOU HAVE SIX OR MORE DRINKS ON ONE OCCASION: NEVER
AUDIT-C TOTAL SCORE: 0

## 2022-07-26 NOTE — PROGRESS NOTES
SUBJECTIVE:   CC: Caleb Talley is an 37 year old woman who presents for preventive health visit.     She is here for physical.  She was sick recently and got an antibiotic.   Now she has itching vaginally and wonders about yeast infection.  The symptoms have been going on about 2 months.   She had this last February and it did not work very well.      Patient has been advised of split billing requirements and indicates understanding: Yes  Healthy Habits:     Getting at least 3 servings of Calcium per day:  Yes    Bi-annual eye exam:  Yes    Dental care twice a year:  NO    Sleep apnea or symptoms of sleep apnea:  None    Diet:  Breakfast skipped    Frequency of exercise:  6-7 days/week    Duration of exercise:  30-45 minutes    Taking medications regularly:  Yes    Medication side effects:  None    PHQ-2 Total Score: 0    Additional concerns today:  No      Today's PHQ-2 Score:   PHQ-2 ( 1999 [employer]) 7/26/2022   Q1: Little interest or pleasure in doing things 0   Q2: Feeling down, depressed or hopeless 0   PHQ-2 Score 0   PHQ-2 Total Score (12-17 Years)- Positive if 3 or more points; Administer PHQ-A if positive -   Q1: Little interest or pleasure in doing things Not at all   Q2: Feeling down, depressed or hopeless Not at all   PHQ-2 Score 0       Abuse: Current or Past (Physical, Sexual or Emotional) - No  Do you feel safe in your environment? Yes        Social History     Tobacco Use     Smoking status: Never Smoker     Smokeless tobacco: Never Used   Substance Use Topics     Alcohol use: No     If you drink alcohol do you typically have >3 drinks per day or >7 drinks per week? No    Alcohol Use 7/26/2022   Prescreen: >3 drinks/day or >7 drinks/week? No   Prescreen: >3 drinks/day or >7 drinks/week? -       Reviewed orders with patient.  Reviewed health maintenance and updated orders accordingly - Yes  Labs reviewed in EPIC    Breast Cancer Screening:    Breast CA Risk Assessment (FHS-7) 6/17/2021   Do you have  a family history of breast, colon, or ovarian cancer? No / Unknown         Patient under 40 years of age: Routine Mammogram Screening not recommended.   Pertinent mammograms are reviewed under the imaging tab.    History of abnormal Pap smear: NO - age 30-65 PAP every 5 years with negative HPV co-testing recommended  PAP / HPV Latest Ref Rng & Units 2/6/2019   PAP (Historical) - NIL   HPV16 NEG:Negative Negative   HPV18 NEG:Negative Negative   HRHPV NEG:Negative Negative     Reviewed and updated as needed this visit by clinical staff  Past Medical History:   Diagnosis Date     Blood type A+ 1/18/2019     Rubella non-immune status, antepartum 1/18/2019       Past Surgical History:   Procedure Laterality Date     NO HISTORY OF SURGERY         MEDICATIONS:  Current Outpatient Medications   Medication     CRYSELLE-28 0.3-30 MG-MCG tablet     ibuprofen (ADVIL/MOTRIN) 600 MG tablet     lanolin ointment     ondansetron (ZOFRAN) 4 MG tablet     Prenatal Vit-Fe Fumarate-FA (PRENATAL MULTIVITAMIN W/IRON) 27-0.8 MG tablet     vitamin C (ASCORBIC ACID) 250 MG tablet     vitamin C (ASCORBIC ACID) 250 MG tablet     vitamin D3 (CHOLECALCIFEROL) 50 mcg (2000 units) tablet     No current facility-administered medications for this visit.       SOCIAL HISTORY:  Social History     Tobacco Use     Smoking status: Never Smoker     Smokeless tobacco: Never Used   Substance Use Topics     Alcohol use: No       No family history on file.        Review of Systems   Constitutional: Negative for chills and fever.   HENT: Negative for congestion, ear pain, hearing loss and sore throat.    Eyes: Negative for pain and visual disturbance.   Respiratory: Negative for cough and shortness of breath.    Cardiovascular: Negative for chest pain, palpitations and peripheral edema.   Gastrointestinal: Negative for abdominal pain, constipation, diarrhea, heartburn, hematochezia and nausea.   Breasts:  Negative for tenderness, breast mass and discharge.  "  Genitourinary: Positive for vaginal bleeding. Negative for dysuria, frequency, genital sores, hematuria, pelvic pain, urgency and vaginal discharge.   Musculoskeletal: Negative for arthralgias, joint swelling and myalgias.   Skin: Negative for rash.   Neurological: Negative for dizziness, weakness, headaches and paresthesias.   Psychiatric/Behavioral: Negative for mood changes. The patient is not nervous/anxious.           OBJECTIVE:   /72 (BP Location: Right arm, Patient Position: Chair, Cuff Size: Adult Regular)   Pulse 88   Temp 98.1  F (36.7  C) (Oral)   Resp 18   Ht 1.48 m (4' 10.27\")   Wt 80.4 kg (177 lb 4.8 oz)   SpO2 98%   BMI 36.72 kg/m    Physical Exam  GENERAL: alert, no distress and over weight  EYES: Eyes grossly normal to inspection, PERRL and conjunctivae and sclerae normal  HENT: ear canals and TM's normal, nose and mouth without ulcers or lesions  NECK: no adenopathy, no asymmetry, masses, or scars and thyroid normal to palpation  RESP: lungs clear to auscultation - no rales, rhonchi or wheezes  BREAST: normal without masses, tenderness or nipple discharge and no palpable axillary masses or adenopathy  CV: regular rate and rhythm, normal S1 S2, no S3 or S4, no murmur, click or rub, no peripheral edema and peripheral pulses strong  ABDOMEN: soft, nontender, no hepatosplenomegaly, no masses and bowel sounds normal  MS: no gross musculoskeletal defects noted, no edema  SKIN: no suspicious lesions or rashes  NEURO: Normal strength and tone, mentation intact and speech normal  PSYCH: mentation appears normal, affect normal/bright  LYMPH: no cervical, supraclavicular, axillary, or inguinal adenopathy        ASSESSMENT/PLAN:       (Z00.00) Routine general medical examination at a health care facility  Comment: doing well - no longer on birth control  Will refill prenatal vitamins and vitamin D3  Plan: vitamin D3 (CHOLECALCIFEROL) 50 mcg (2000         units) tablet, Prenatal Vit-Fe " "Fumarate-FA         (PRENATAL MULTIVITAMIN W/IRON) 27-0.8 MG tablet            (B37.3) Yeast infection of the vagina  Comment: will send in 2 rx  Plan: fluconazole (DIFLUCAN) 150 MG tablet,         hydrocortisone (CORTAID) 1 % external cream              Patient has been advised of split billing requirements and indicates understanding: Yes    COUNSELING:  Reviewed preventive health counseling, as reflected in patient instructions  Special attention given to:        Regular exercise       Contraception       Family planning    Estimated body mass index is 35.49 kg/m  as calculated from the following:    Height as of 4/6/22: 1.473 m (4' 10\").    Weight as of 4/6/22: 77 kg (169 lb 12.8 oz).    Weight management plan: Discussed healthy diet and exercise guidelines    She reports that she has never smoked. She has never used smokeless tobacco.      Counseling Resources:  ATP IV Guidelines  Pooled Cohorts Equation Calculator  Breast Cancer Risk Calculator  BRCA-Related Cancer Risk Assessment: FHS-7 Tool  FRAX Risk Assessment  ICSI Preventive Guidelines  Dietary Guidelines for Americans, 2010  USDA's MyPlate  ASA Prophylaxis  Lung CA Screening    Rin Hoffmann MD  [facility]"

## 2022-08-02 ENCOUNTER — HOSPITAL ENCOUNTER (OUTPATIENT)
Dept: ULTRASOUND IMAGING | Facility: CLINIC | Age: 37
Discharge: HOME | End: 2022-08-02
Attending: FAMILY MEDICINE | Admitting: FAMILY MEDICINE
Payer: COMMERCIAL

## 2022-08-02 DIAGNOSIS — N97.9 SECONDARY FEMALE INFERTILITY: ICD-10-CM

## 2022-08-02 PROCEDURE — 76830 TRANSVAGINAL US NON-OB: CPT

## 2023-03-02 ENCOUNTER — TELEPHONE (OUTPATIENT)
Dept: FAMILY MEDICINE | Facility: CLINIC | Age: 38
End: 2023-03-02
Payer: COMMERCIAL

## 2023-05-02 ENCOUNTER — OFFICE VISIT (OUTPATIENT)
Dept: FAMILY MEDICINE | Facility: CLINIC | Age: 38
End: 2023-05-02
Payer: COMMERCIAL

## 2023-05-02 VITALS
BODY MASS INDEX: 37.76 KG/M2 | HEIGHT: 57 IN | TEMPERATURE: 98 F | HEART RATE: 86 BPM | SYSTOLIC BLOOD PRESSURE: 116 MMHG | DIASTOLIC BLOOD PRESSURE: 80 MMHG | WEIGHT: 175 LBS | OXYGEN SATURATION: 100 %

## 2023-05-02 DIAGNOSIS — N92.6 IRREGULAR MENSTRUAL CYCLE: Primary | ICD-10-CM

## 2023-05-02 LAB
ERYTHROCYTE [DISTWIDTH] IN BLOOD BY AUTOMATED COUNT: 13.6 % (ref 10–15)
FERRITIN SERPL-MCNC: 49 NG/ML (ref 6–175)
HCT VFR BLD AUTO: 39.1 % (ref 35–47)
HGB BLD-MCNC: 12.5 G/DL (ref 11.7–15.7)
IRON BINDING CAPACITY (ROCHE): 293 UG/DL (ref 240–430)
IRON SATN MFR SERPL: 17 % (ref 15–46)
IRON SERPL-MCNC: 49 UG/DL (ref 37–145)
MCH RBC QN AUTO: 27.4 PG (ref 26.5–33)
MCHC RBC AUTO-ENTMCNC: 32 G/DL (ref 31.5–36.5)
MCV RBC AUTO: 86 FL (ref 78–100)
PLATELET # BLD AUTO: 199 10E3/UL (ref 150–450)
RBC # BLD AUTO: 4.56 10E6/UL (ref 3.8–5.2)
TSH SERPL DL<=0.005 MIU/L-ACNC: 3.27 UIU/ML (ref 0.3–4.2)
WBC # BLD AUTO: 5.1 10E3/UL (ref 4–11)

## 2023-05-02 PROCEDURE — 84443 ASSAY THYROID STIM HORMONE: CPT | Performed by: PHYSICIAN ASSISTANT

## 2023-05-02 PROCEDURE — 85027 COMPLETE CBC AUTOMATED: CPT | Performed by: PHYSICIAN ASSISTANT

## 2023-05-02 PROCEDURE — 83540 ASSAY OF IRON: CPT | Performed by: PHYSICIAN ASSISTANT

## 2023-05-02 PROCEDURE — 82728 ASSAY OF FERRITIN: CPT | Performed by: PHYSICIAN ASSISTANT

## 2023-05-02 PROCEDURE — 36415 COLL VENOUS BLD VENIPUNCTURE: CPT | Performed by: PHYSICIAN ASSISTANT

## 2023-05-02 PROCEDURE — 82306 VITAMIN D 25 HYDROXY: CPT | Performed by: PHYSICIAN ASSISTANT

## 2023-05-02 PROCEDURE — 99214 OFFICE O/P EST MOD 30 MIN: CPT | Performed by: PHYSICIAN ASSISTANT

## 2023-05-02 PROCEDURE — 83550 IRON BINDING TEST: CPT | Performed by: PHYSICIAN ASSISTANT

## 2023-05-02 RX ORDER — MULTIVIT-MIN/IRON/FOLIC ACID/K 18-600-40
1 CAPSULE ORAL
COMMUNITY
End: 2023-11-29

## 2023-05-02 RX ORDER — PRENATAL VIT/IRON FUM/FOLIC AC 27MG-0.8MG
1 TABLET ORAL DAILY
Qty: 100 TABLET | Refills: 3 | Status: SHIPPED | OUTPATIENT
Start: 2023-05-02 | End: 2023-06-06

## 2023-05-02 RX ORDER — MULTIVIT WITH MINERALS/LUTEIN
TABLET ORAL
COMMUNITY
End: 2023-05-02

## 2023-05-02 NOTE — PROGRESS NOTES
Assessment & Plan     Irregular menstrual cycle  Unclear etiology. She has had irregular cycles since having her child. She has had an ultrasound of the pelvis which was normal in August of last year.  She has not been sexually active and thus it cannot be pregnancy (she declines urine pregnancy today).  Checking other labs as noted below.  - Ob/Gyn Referral; Future  - TSH with free T4 reflex; Future  - CBC with platelets; Future  - Vitamin D Deficiency; Future  - Iron and iron binding capacity; Future  - Ferritin; Future  - Prenatal Vit-Fe Fumarate-FA (PRENATAL MULTIVITAMIN W/IRON) 27-0.8 MG tablet; Take 1 tablet by mouth daily  - TSH with free T4 reflex  - CBC with platelets  - Vitamin D Deficiency  - Iron and iron binding capacity  - Ferritin    Dr. Hoffmann's note reviewed from last July.  Review of external notes as documented elsewhere in note  Review of the result(s) of each unique test - US pelvis from August  Ordering of each unique test  Prescription drug management    Etelvina Mathew PA-C  [facility] TYLER Ellis is a 38 year old, presenting for the following health issues:  Hematology        5/2/2023    10:21 AM   Additional Questions   Roomed by Karma Cuello CMA   Accompanied by self     HPI     Menstrual cycle has been irregular (she reports this has been the case for the past 3 years). She has one child, almost 4 years old.  2/10/23 last menstrual cycle, currently on menstrual cycle (started this morning)    She reports the bleeding has been different (having less blood). She is having some pain/cramping (not significant).    She had an ultrasound of the pelvis in August of last year which as normal.    It appears she saw Dr. Griselda Duarte OB/[facility]N- At [facility] (diagnosed with irregular menstruation and hypertrophy of uterus, unable to see notes)      Review of Systems   GENERAL:  No fevers         Objective    /80 (BP Location: Right arm, Patient  "Position: Sitting, Cuff Size: Adult Regular)   Pulse 86   Temp 98  F (36.7  C) (Oral)   Ht 1.448 m (4' 9\")   Wt 79.4 kg (175 lb)   LMP 05/02/2023   SpO2 100%   Breastfeeding No   BMI 37.87 kg/m    Body mass index is 37.87 kg/m .  Physical Exam   GENERAL: No acute distress  HEENT: Normocephalic  NEURO: Alert and non-focal      Results for orders placed or performed in visit on 05/02/23 (from the past 24 hour(s))   CBC with platelets   Result Value Ref Range    WBC Count 5.1 4.0 - 11.0 10e3/uL    RBC Count 4.56 3.80 - 5.20 10e6/uL    Hemoglobin 12.5 11.7 - 15.7 g/dL    Hematocrit 39.1 35.0 - 47.0 %    MCV 86 78 - 100 fL    MCH 27.4 26.5 - 33.0 pg    MCHC 32.0 31.5 - 36.5 g/dL    RDW 13.6 10.0 - 15.0 %    Platelet Count 199 150 - 450 10e3/uL                 "

## 2023-05-04 LAB — DEPRECATED CALCIDIOL+CALCIFEROL SERPL-MC: 44 UG/L (ref 20–75)

## 2023-05-05 ENCOUNTER — TELEPHONE (OUTPATIENT)
Dept: FAMILY MEDICINE | Facility: CLINIC | Age: 38
End: 2023-05-05
Payer: COMMERCIAL

## 2023-05-05 NOTE — TELEPHONE ENCOUNTER
----- Message from Etelvina Mathew PA-C sent at 5/4/2023  5:23 PM CDT -----  Please call patient and let her know that all testing was normal.   Vitamin D was 44 (normal)  Thyroid testing normal  Iron testing is all normal including Ferritin.  Complete blood count is normal.

## 2023-05-05 NOTE — TELEPHONE ENCOUNTER
Spoke with patient using [employer]  services and results were communicated. Patient had no further questions.  Karma Cuello CMA ([facility])

## 2023-06-06 ENCOUNTER — TELEPHONE (OUTPATIENT)
Dept: FAMILY MEDICINE | Facility: CLINIC | Age: 38
End: 2023-06-06
Payer: COMMERCIAL

## 2023-06-06 DIAGNOSIS — Z00.00 ROUTINE GENERAL MEDICAL EXAMINATION AT A HEALTH CARE FACILITY: ICD-10-CM

## 2023-06-06 DIAGNOSIS — N92.6 IRREGULAR MENSTRUAL CYCLE: ICD-10-CM

## 2023-06-06 RX ORDER — PRENATAL VIT/IRON FUM/FOLIC AC 27MG-0.8MG
1 TABLET ORAL DAILY
Qty: 100 TABLET | Refills: 3 | Status: SHIPPED | OUTPATIENT
Start: 2023-06-06 | End: 2023-11-29

## 2023-06-06 RX ORDER — CHOLECALCIFEROL (VITAMIN D3) 50 MCG
1 TABLET ORAL DAILY
Qty: 100 TABLET | Refills: 3 | Status: SHIPPED | OUTPATIENT
Start: 2023-06-06 | End: 2024-01-31

## 2023-06-26 ENCOUNTER — PATIENT OUTREACH (OUTPATIENT)
Dept: CARE COORDINATION | Facility: CLINIC | Age: 38
End: 2023-06-26
Payer: COMMERCIAL

## 2023-07-10 ENCOUNTER — PATIENT OUTREACH (OUTPATIENT)
Dept: CARE COORDINATION | Facility: CLINIC | Age: 38
End: 2023-07-10
Payer: COMMERCIAL

## 2023-09-27 ENCOUNTER — APPOINTMENT (OUTPATIENT)
Dept: INTERPRETER SERVICES | Facility: CLINIC | Age: 38
End: 2023-09-27
Payer: COMMERCIAL

## 2023-09-27 ENCOUNTER — TELEPHONE (OUTPATIENT)
Dept: FAMILY MEDICINE | Facility: CLINIC | Age: 38
End: 2023-09-27
Payer: COMMERCIAL

## 2023-09-27 NOTE — TELEPHONE ENCOUNTER
Patient left voicemail requesting return call from a nurse. Will route to triage to call patient.     Tammy KAYE RN, BSN, PHN  [facility]  [phone]

## 2023-10-29 ENCOUNTER — TELEPHONE (OUTPATIENT)
Dept: FAMILY MEDICINE | Facility: CLINIC | Age: 38
End: 2023-10-29
Payer: COMMERCIAL

## 2023-10-29 NOTE — TELEPHONE ENCOUNTER
Reason for Call:  Appointment Request    Patient requesting this type of appt:  Yes    Requested provider: Rin Hoffmann    Reason patient unable to be scheduled: Not in the time frame they were looking for    When does patient want to be seen/preferred time: 3-7 days    Comments: N/A    Okay to leave a detailed message?: Yes at Cell number on file:    Telephone Information:   Mobile [phone]       Call taken on 10/29/2023 at 3:00 PM by Radha Resendiz

## 2023-10-30 ENCOUNTER — TELEPHONE (OUTPATIENT)
Dept: NURSING | Facility: CLINIC | Age: 38
End: 2023-10-30
Payer: COMMERCIAL

## 2023-10-30 NOTE — TELEPHONE ENCOUNTER
Patient returning phone call from [facility]. Patient transferred to Clinic.     SRINATH ALBRIGHT RN  [facility] nurse advisors  10/30/2023  11:13 AM

## 2023-11-29 ENCOUNTER — OFFICE VISIT (OUTPATIENT)
Dept: FAMILY MEDICINE | Facility: CLINIC | Age: 38
End: 2023-11-29
Payer: COMMERCIAL

## 2023-11-29 VITALS
BODY MASS INDEX: 36.31 KG/M2 | DIASTOLIC BLOOD PRESSURE: 54 MMHG | HEIGHT: 58 IN | SYSTOLIC BLOOD PRESSURE: 93 MMHG | RESPIRATION RATE: 16 BRPM | HEART RATE: 92 BPM | OXYGEN SATURATION: 99 % | WEIGHT: 173 LBS | TEMPERATURE: 98 F

## 2023-11-29 DIAGNOSIS — R30.0 DYSURIA: ICD-10-CM

## 2023-11-29 DIAGNOSIS — Z12.4 CERVICAL CANCER SCREENING: ICD-10-CM

## 2023-11-29 DIAGNOSIS — N91.2 AMENORRHEA: ICD-10-CM

## 2023-11-29 DIAGNOSIS — Z13.6 CARDIOVASCULAR SCREENING; LDL GOAL LESS THAN 130: ICD-10-CM

## 2023-11-29 DIAGNOSIS — N92.6 IRREGULAR MENSTRUAL CYCLE: ICD-10-CM

## 2023-11-29 DIAGNOSIS — E66.01 MORBID OBESITY (H): ICD-10-CM

## 2023-11-29 DIAGNOSIS — Z00.00 ROUTINE GENERAL MEDICAL EXAMINATION AT A HEALTH CARE FACILITY: Primary | ICD-10-CM

## 2023-11-29 LAB
ALBUMIN SERPL BCG-MCNC: 4.2 G/DL (ref 3.5–5.2)
ALBUMIN UR-MCNC: NEGATIVE MG/DL
ALP SERPL-CCNC: 68 U/L (ref 40–150)
ALT SERPL W P-5'-P-CCNC: 15 U/L (ref 0–50)
ANION GAP SERPL CALCULATED.3IONS-SCNC: 10 MMOL/L (ref 7–15)
APPEARANCE UR: CLEAR
AST SERPL W P-5'-P-CCNC: 21 U/L (ref 0–45)
BILIRUB SERPL-MCNC: 0.2 MG/DL
BILIRUB UR QL STRIP: NEGATIVE
BUN SERPL-MCNC: 4.5 MG/DL (ref 6–20)
CALCIUM SERPL-MCNC: 9.4 MG/DL (ref 8.6–10)
CHLORIDE SERPL-SCNC: 102 MMOL/L (ref 98–107)
CHOLEST SERPL-MCNC: 240 MG/DL
COLOR UR AUTO: YELLOW
CREAT SERPL-MCNC: 0.46 MG/DL (ref 0.51–0.95)
DEPRECATED HCO3 PLAS-SCNC: 27 MMOL/L (ref 22–29)
EGFRCR SERPLBLD CKD-EPI 2021: >90 ML/MIN/1.73M2
FSH SERPL IRP2-ACNC: 14.7 MIU/ML
GLUCOSE SERPL-MCNC: 90 MG/DL (ref 70–99)
GLUCOSE UR STRIP-MCNC: NEGATIVE MG/DL
HBA1C MFR BLD: 5.4 % (ref 0–5.6)
HBV SURFACE AB SERPL IA-ACNC: 184.97 M[IU]/ML
HBV SURFACE AB SERPL IA-ACNC: REACTIVE M[IU]/ML
HCG UR QL: NEGATIVE
HDLC SERPL-MCNC: 67 MG/DL
HGB UR QL STRIP: NEGATIVE
KETONES UR STRIP-MCNC: NEGATIVE MG/DL
LDLC SERPL CALC-MCNC: 157 MG/DL
LEUKOCYTE ESTERASE UR QL STRIP: NEGATIVE
NITRATE UR QL: NEGATIVE
NONHDLC SERPL-MCNC: 173 MG/DL
PH UR STRIP: 7 [PH] (ref 5–7)
POTASSIUM SERPL-SCNC: 4 MMOL/L (ref 3.4–5.3)
PROLACTIN SERPL 3RD IS-MCNC: 5 NG/ML (ref 5–23)
PROT SERPL-MCNC: 7.8 G/DL (ref 6.4–8.3)
SODIUM SERPL-SCNC: 139 MMOL/L (ref 135–145)
SP GR UR STRIP: 1.01 (ref 1–1.03)
TRIGL SERPL-MCNC: 81 MG/DL
TSH SERPL DL<=0.005 MIU/L-ACNC: 2.34 UIU/ML (ref 0.3–4.2)
UROBILINOGEN UR STRIP-ACNC: 0.2 E.U./DL

## 2023-11-29 PROCEDURE — G0145 SCR C/V CYTO,THINLAYER,RESCR: HCPCS | Performed by: FAMILY MEDICINE

## 2023-11-29 PROCEDURE — 81003 URINALYSIS AUTO W/O SCOPE: CPT | Performed by: FAMILY MEDICINE

## 2023-11-29 PROCEDURE — 83036 HEMOGLOBIN GLYCOSYLATED A1C: CPT | Performed by: FAMILY MEDICINE

## 2023-11-29 PROCEDURE — 99395 PREV VISIT EST AGE 18-39: CPT | Mod: 25 | Performed by: FAMILY MEDICINE

## 2023-11-29 PROCEDURE — 86706 HEP B SURFACE ANTIBODY: CPT | Performed by: FAMILY MEDICINE

## 2023-11-29 PROCEDURE — 90686 IIV4 VACC NO PRSV 0.5 ML IM: CPT | Performed by: FAMILY MEDICINE

## 2023-11-29 PROCEDURE — 90471 IMMUNIZATION ADMIN: CPT | Performed by: FAMILY MEDICINE

## 2023-11-29 PROCEDURE — 81025 URINE PREGNANCY TEST: CPT | Performed by: FAMILY MEDICINE

## 2023-11-29 PROCEDURE — 87624 HPV HI-RISK TYP POOLED RSLT: CPT | Performed by: FAMILY MEDICINE

## 2023-11-29 PROCEDURE — 80061 LIPID PANEL: CPT | Performed by: FAMILY MEDICINE

## 2023-11-29 PROCEDURE — 83001 ASSAY OF GONADOTROPIN (FSH): CPT | Performed by: FAMILY MEDICINE

## 2023-11-29 PROCEDURE — 99214 OFFICE O/P EST MOD 30 MIN: CPT | Mod: 25 | Performed by: FAMILY MEDICINE

## 2023-11-29 PROCEDURE — 84443 ASSAY THYROID STIM HORMONE: CPT | Performed by: FAMILY MEDICINE

## 2023-11-29 PROCEDURE — 36415 COLL VENOUS BLD VENIPUNCTURE: CPT | Performed by: FAMILY MEDICINE

## 2023-11-29 PROCEDURE — G0124 SCREEN C/V THIN LAYER BY MD: HCPCS | Performed by: PATHOLOGY

## 2023-11-29 PROCEDURE — 80053 COMPREHEN METABOLIC PANEL: CPT | Performed by: FAMILY MEDICINE

## 2023-11-29 PROCEDURE — 84146 ASSAY OF PROLACTIN: CPT | Performed by: FAMILY MEDICINE

## 2023-11-29 RX ORDER — PRENATAL VIT/IRON FUM/FOLIC AC 27MG-0.8MG
1 TABLET ORAL DAILY
Qty: 100 TABLET | Refills: 3 | Status: SHIPPED | OUTPATIENT
Start: 2023-11-29 | End: 2024-07-25

## 2023-11-29 NOTE — PROGRESS NOTES
SUBJECTIVE:   Caleb is a 38 year old, presenting for the following:   she is here fore well visit but is concerned about amenorrhea.   She has had irregular cycles since her last baby in .  It came back after delivery.    It was pretty normal until  and then started skipping months and then she went 5 months with no period and had her last period early November of this year. It was only a day or two.     She also mentions that she has to urinate often and it burns at times.     Amenorrhea        2023    10:29 AM   Additional Questions   Roomed by Sasha       Healthy Habits:     Getting at least 3 servings of Calcium per day:  NO    Bi-annual eye exam:  NO    Dental care twice a year:  NO    Sleep apnea or symptoms of sleep apnea:  None    Diet:  Regular (no restrictions)    Frequency of exercise:  None    Taking medications regularly:  No    Barriers to taking medications:  None    Medication side effects:  None    Additional concerns today:  Yes (Has not had a period since )            Social History     Tobacco Use    Smoking status: Never    Smokeless tobacco: Never   Substance Use Topics    Alcohol use: No           Reviewed orders with patient.  Reviewed health maintenance and updated orders accordingly - Yes  Labs reviewed in EPIC  BP Readings from Last 3 Encounters:   23 93/54   23 116/80   22 101/72    Wt Readings from Last 3 Encounters:   23 78.5 kg (173 lb)   23 79.4 kg (175 lb)   22 80.4 kg (177 lb 4.8 oz)                    Breast Cancer Screenin/29/2023   Breast CA Risk Assessment (FHS-7)   Do you have a family history of breast, colon, or ovarian cancer? No / Unknown           Pertinent mammograms are reviewed under the imaging tab.    History of abnormal Pap smear: NO - age 30-65 PAP every 5 years with negative HPV co-testing recommended      Latest Ref Rng & Units 2019    11:34 AM 2019    11:10 AM   PAP / HPV   PAP  "(Historical)  NIL     HPV 16 DNA NEG^Negative  Negative    HPV 18 DNA NEG^Negative  Negative    Other HR HPV NEG^Negative  Negative      Reviewed and updated as needed this visit by clinical staff     Meds              Reviewed and updated as needed this visit by Provider                     Review of Systems  CONSTITUTIONAL: NEGATIVE for fever, chills, change in weight  INTEGUMENTARU/SKIN: NEGATIVE for worrisome rashes, moles or lesions  EYES: NEGATIVE for vision changes or irritation  ENT: NEGATIVE for ear, mouth and throat problems  RESP: NEGATIVE for significant cough or SOB  BREAST: NEGATIVE for masses, tenderness or discharge  CV: NEGATIVE for chest pain, palpitations or peripheral edema  GI: NEGATIVE for nausea, abdominal pain, heartburn, or change in bowel habits   female: see above   MUSCULOSKELETAL: NEGATIVE for significant arthralgias or myalgia  NEURO: NEGATIVE for weakness, dizziness or paresthesias  PSYCHIATRIC: NEGATIVE for changes in mood or affect     OBJECTIVE:   BP 93/54 (BP Location: Left arm, Patient Position: Chair, Cuff Size: Adult Regular)   Pulse 92   Temp 98  F (36.7  C) (Oral)   Resp 16   Ht 1.473 m (4' 10\")   Wt 78.5 kg (173 lb)   SpO2 99%   BMI 36.16 kg/m    Physical Exam  GENERAL: healthy, alert, no distress, and obese  EYES: Eyes grossly normal to inspection, PERRL and conjunctivae and sclerae normal  HENT: ear canals and TM's normal, nose and mouth without ulcers or lesions  NECK: no adenopathy, no asymmetry, masses, or scars and thyroid normal to palpation  RESP: lungs clear to auscultation - no rales, rhonchi or wheezes  BREAST: normal without masses, tenderness or nipple discharge and no palpable axillary masses or adenopathy  CV: regular rate and rhythm, normal S1 S2, no S3 or S4, no murmur, click or rub, no peripheral edema and peripheral pulses strong  ABDOMEN: soft, nontender, no hepatosplenomegaly, no masses and bowel sounds normal   (female): normal female " external genitalia, normal urethral meatus, vaginal mucosa pink, moist, well rugated, and normal cervix/adnexa/uterus without masses or discharge  MS: no gross musculoskeletal defects noted, no edema  SKIN: no suspicious lesions or rashes  NEURO: Normal strength and tone, mentation intact and speech normal  PSYCH: mentation appears normal, affect normal/bright  LYMPH: no cervical, supraclavicular, axillary, or inguinal adenopathy    Diagnostic Test Results:  Labs reviewed in Epic    ASSESSMENT/PLAN:   (Z00.00) Routine general medical examination at a health care facility  (primary encounter diagnosis)  Comment:   Plan: PRIMARY CARE FOLLOW-UP SCHEDULING, REVIEW OF         HEALTH MAINTENANCE PROTOCOL ORDERS, INFLUENZA         VACCINE IM > 6 MONTHS VALENT IIV4         (AFLURIA/FLUZONE), HCG qualitative urine,         Hepatitis B Surface Antibody, CANCELED:         COVID-19 12+ (2023-24) (PFIZER)            (Z12.4) Cervical cancer screening  Comment:   Plan: Pap Screen with HPV - recommended age 30 - 65         years            (N91.2) Amenorrhea  Comment: weight affects this - could be PCOS but ultasound last year did not show.   Could be thyroid or structural issues  Plan: hCG Quantitative Pregnancy, TSH with free T4         reflex, Follicle stimulating hormone,         Prolactin, Comprehensive metabolic panel (BMP +        Alb, Alk Phos, ALT, AST, Total. Bili, TP),         Hemoglobin A1c, Ob/Gyn  Referral, US         Pelvic Complete with Transvaginal            (E66.01) Morbid obesity (H)  Comment:   Plan: Comprehensive metabolic panel (BMP + Alb, Alk         Phos, ALT, AST, Total. Bili, TP), Hemoglobin         A1c            (Z13.6) CARDIOVASCULAR SCREENING; LDL GOAL LESS THAN 130  Comment:   Plan: Lipid panel reflex to direct LDL Fasting            (R30.0) Dysuria  Comment:   Plan: UA Macroscopic with reflex to Microscopic and         Culture - Lab Collect            (N92.6) Irregular menstrual  "cycle  Comment: see above   Plan: Prenatal Vit-Fe Fumarate-FA (PRENATAL         MULTIVITAMIN W/IRON) 27-0.8 MG tablet            Patient has been advised of split billing requirements and indicates understanding: Yes      COUNSELING:  Reviewed preventive health counseling, as reflected in patient instructions       Regular exercise       Immunizations  Vaccinated for: Influenza           Family planning      BMI:   Estimated body mass index is 36.16 kg/m  as calculated from the following:    Height as of this encounter: 1.473 m (4' 10\").    Weight as of this encounter: 78.5 kg (173 lb).   Weight management plan: Discussed healthy diet and exercise guidelines      She reports that she has never smoked. She has never used smokeless tobacco.        Rin Hoffmann MD  [facility]"

## 2023-11-29 NOTE — LETTER
December 5, 2023      Caleb Talley  [address]   [place] [address]        Dear ,    We are writing to inform you of your test results.    All the labs are good.   The electrolytes and kidney and liver function are good.   The hormone levels are good.   The lipids are stable.   The A1c does NOT show diabetes or prediabetes.   You are IMMUNE to hepatitis B.      Resulted Orders   HCG qualitative urine   Result Value Ref Range    hCG Urine Qualitative Negative Negative      Comment:      This test is for screening purposes.  Results should be interpreted along with the clinical picture.  Confirmation testing is available if warranted by ordering [id number], HCG Quantitative Pregnancy.   TSH with free T4 reflex   Result Value Ref Range    TSH 2.34 0.30 - 4.20 uIU/mL   Follicle stimulating hormone   Result Value Ref Range    FSH 14.7 mIU/mL      Comment:      19 years and older:   Follicular phase: 3.5-12.5 mIU/mL   Ovulation phase: 4.7-21.5 mIU/mL   Luteal phase: 1.7-7.7 mIU/mL   Postmenopause: 25.8-134.8 mIU/mL      Prolactin   Result Value Ref Range    Prolactin 5 5 - 23 ng/mL   Lipid panel reflex to direct LDL Fasting   Result Value Ref Range    Cholesterol 240 (H) <200 mg/dL    Triglycerides 81 <150 mg/dL    Direct Measure HDL 67 >=50 mg/dL    LDL Cholesterol Calculated 157 (H) <=100 mg/dL    Non HDL Cholesterol 173 (H) <130 mg/dL    Narrative    Cholesterol  Desirable:  <200 mg/dL    Triglycerides  Normal:  Less than 150 mg/dL  Borderline High:  150-199 mg/dL  High:  200-499 mg/dL  Very High:  Greater than or equal to 500 mg/dL    Direct Measure HDL  Female:  Greater than or equal to 50 mg/dL   Male:  Greater than or equal to 40 mg/dL    LDL Cholesterol  Desirable:  <100mg/dL  Above Desirable:  100-129 mg/dL   Borderline High:  130-159 mg/dL   High:  160-189 mg/dL   Very High:  >= 190 mg/dL    Non HDL Cholesterol  Desirable:  130 mg/dL  Above Desirable:  130-159 mg/dL  Borderline High:  160-189  mg/dL  High:  190-219 mg/dL  Very High:  Greater than or equal to 220 mg/dL   Comprehensive metabolic panel (BMP + Alb, Alk Phos, ALT, AST, Total. Bili, TP)   Result Value Ref Range    Sodium 139 135 - 145 mmol/L      Comment:      Reference intervals for this test were updated on 09/26/2023 to more accurately reflect our healthy population. There may be differences in the flagging of prior results with similar values performed with this method. Interpretation of those prior results can be made in the context of the updated reference intervals.     Potassium 4.0 3.4 - 5.3 mmol/L    Carbon Dioxide (CO2) 27 22 - 29 mmol/L    Anion Gap 10 7 - 15 mmol/L    Urea Nitrogen 4.5 (L) 6.0 - 20.0 mg/dL    Creatinine 0.46 (L) 0.51 - 0.95 mg/dL    GFR Estimate >90 >60 mL/min/1.73m2    Calcium 9.4 8.6 - 10.0 mg/dL    Chloride 102 98 - 107 mmol/L    Glucose 90 70 - 99 mg/dL    Alkaline Phosphatase 68 40 - 150 U/L      Comment:      Reference intervals for this test were updated on 11/14/2023 to more accurately reflect our healthy population. There may be differences in the flagging of prior results with similar values performed with this method. Interpretation of those prior results can be made in the context of the updated reference intervals.    AST 21 0 - 45 U/L      Comment:      Reference intervals for this test were updated on 6/12/2023 to more accurately reflect our healthy population. There may be differences in the flagging of prior results with similar values performed with this method. Interpretation of those prior results can be made in the context of the updated reference intervals.    ALT 15 0 - 50 U/L      Comment:      Reference intervals for this test were updated on 6/12/2023 to more accurately reflect our healthy population. There may be differences in the flagging of prior results with similar values performed with this method. Interpretation of those prior results can be made in the context of the updated  reference intervals.      Protein Total 7.8 6.4 - 8.3 g/dL    Albumin 4.2 3.5 - 5.2 g/dL    Bilirubin Total 0.2 <=1.2 mg/dL   Hepatitis B Surface Antibody   Result Value Ref Range    Hepatitis B Surface Antibody Instrument Value 184.97 <8.00 m[IU]/mL    Hepatitis B Surface Antibody Reactive       Comment:      Patient is considered to be immune to infection with hepatitis B when the value is greater than or equal to 12.00 mIU/mL.   Hemoglobin A1c   Result Value Ref Range    Hemoglobin A1C 5.4 0.0 - 5.6 %      Comment:      Normal <5.7%   Prediabetes 5.7-6.4%    Diabetes 6.5% or higher     Note: Adopted from ADA consensus guidelines.   UA Macroscopic with reflex to Microscopic and Culture - Lab Collect   Result Value Ref Range    Color Urine Yellow Colorless, Straw, Light Yellow, Yellow    Appearance Urine Clear Clear    Glucose Urine Negative Negative mg/dL    Bilirubin Urine Negative Negative    Ketones Urine Negative Negative mg/dL    Specific Gravity Urine 1.015 1.003 - 1.035    Blood Urine Negative Negative    pH Urine 7.0 5.0 - 7.0    Protein Albumin Urine Negative Negative mg/dL    Urobilinogen Urine 0.2 0.2, 1.0 E.U./dL    Nitrite Urine Negative Negative    Leukocyte Esterase Urine Negative Negative    Narrative    Microscopic not indicated       If you have any questions or concerns, please call the clinic at the number listed above.       Sincerely,      Rin Hoffmann MD

## 2023-12-01 ENCOUNTER — HOSPITAL ENCOUNTER (OUTPATIENT)
Dept: ULTRASOUND IMAGING | Facility: CLINIC | Age: 38
Discharge: HOME | End: 2023-12-01
Attending: FAMILY MEDICINE | Admitting: FAMILY MEDICINE
Payer: COMMERCIAL

## 2023-12-01 DIAGNOSIS — N91.2 AMENORRHEA: ICD-10-CM

## 2023-12-01 PROCEDURE — 76830 TRANSVAGINAL US NON-OB: CPT

## 2023-12-04 LAB
BKR LAB AP GYN ADEQUACY: ABNORMAL
BKR LAB AP GYN INTERPRETATION: ABNORMAL
BKR LAB AP HPV REFLEX: ABNORMAL
BKR LAB AP PREVIOUS ABNORMAL: ABNORMAL
PATH REPORT.COMMENTS IMP SPEC: ABNORMAL
PATH REPORT.COMMENTS IMP SPEC: ABNORMAL
PATH REPORT.RELEVANT HX SPEC: ABNORMAL

## 2023-12-05 PROBLEM — R87.610 ASCUS OF CERVIX WITH NEGATIVE HIGH RISK HPV: Status: ACTIVE | Noted: 2023-12-05

## 2023-12-05 LAB
HUMAN PAPILLOMA VIRUS 16 DNA: NEGATIVE
HUMAN PAPILLOMA VIRUS 18 DNA: NEGATIVE
HUMAN PAPILLOMA VIRUS FINAL DIAGNOSIS: NORMAL
HUMAN PAPILLOMA VIRUS OTHER HR: NEGATIVE

## 2023-12-05 NOTE — PROGRESS NOTES
"Subjective:     Judah Moise is a 2 m o  male who is brought in for this well child visit  History provided by: mother and father    Current Issues:  Current concerns: flaky skin, especially on head, aquaphor can help  Also a new bump by fontanelle, no trauma no fever  Well Child Assessment:  History was provided by the mother and father  Imtiaz Robison lives with his mother, father and grandmother  Interval problems do not include caregiver depression, caregiver stress or lack of social support  Nutrition  Types of milk consumed include formula  Formula - Formula type: similac total care 360  4 ounces of formula are consumed per feeding  Feedings occur every 1-3 hours  Feeding problems do not include burping poorly, spitting up or vomiting  Elimination  Urination occurs with every feeding  Bowel movements occur 1-3 times per 24 hours  Stools have a loose consistency  Elimination problems include gas  Elimination problems do not include colic or urinary symptoms  ([place])   Sleep  Sleep location: all over  Child falls asleep while on own  Sleep positions include supine  Average sleep duration is 6 hours  Safety  Home is child-proofed? no  There is no smoking in the home  Home has working smoke alarms? yes  Home has working carbon monoxide alarms? yes  There is an appropriate car seat in use  Screening  Immunizations are up-to-date  The  screens are normal    Social  The caregiver enjoys the child  Childcare is provided at child's home  The childcare provider is a parent or relative         Birth History    Birth     Length: 21\" (53 3 cm)     Weight: 3400 g (7 lb 7 9 oz)     HC 37 cm (14 57\")    Apgar     One: 9     Five: 9    Discharge Weight: 3215 g (7 lb 1 4 oz)    Delivery Method: , Low Transverse    Gestation Age: 36 1/7 wks    Days in Hospital: 3 0   [facility] Name: Re[facility] Location: [place], [place]     Baby Boy [facility]) Candance Dust is a 3400 g (7 lb 7 9 " Letter sent.  Nyla Mena, TC     "oz) AGA male born to a 27 y o   mother   Bilirubin 5 12 at 27 hours of life which is well below threshold for phototherapy  Bilateral preauricular pits on exam - otherwise normal   Maternal history of THC - both mother and infant UDS neg  Hearing screen passed  CCHD screen passed       The following portions of the patient's history were reviewed and updated as appropriate: allergies, current medications, past family history, past medical history, past social history, past surgical history and problem list     Developmental Birth-1 Month Appropriate     Question Response Comments    Follows visually Yes  Yes on 2023 (Age - 2 m)    Appears to respond to sound Yes  Yes on 2023 (Age - 2 m)      Developmental 2 Months Appropriate     Question Response Comments    Follows visually through range of 90 degrees Yes  Yes on 2023 (Age - 2 m)    Lifts head momentarily Yes  Yes on 2023 (Age - 2 m)    Social smile Yes  Yes on 2023 (Age - 2 m)            Objective:     Growth parameters are noted and are appropriate for age  Wt Readings from Last 1 Encounters:   23 6464 g (14 lb 4 oz) (87 %, Z= 1 11)*     * Growth percentiles are based on WHO (Boys, 0-2 years) data  Ht Readings from Last 1 Encounters:   23 5\" (59 7 cm) (68 %, Z= 0 48)*     * Growth percentiles are based on WHO (Boys, 0-2 years) data  Head Circumference: 42 cm (16 54\")    Vitals:    23 1409   Weight: 6464 g (14 lb 4 oz)   Height: 23 5\" (59 7 cm)   HC: 42 cm (16 54\")        Physical Exam  Constitutional:       General: He is not in acute distress  HENT:      Head: Normocephalic and atraumatic  Anterior fontanelle is flat  Comments: Firm mass on superior R side of anterior fontanelle without overlying skin changes, not fluctuant but not hard, minimal mobility  Cradle cap       Right Ear: Tympanic membrane and external ear normal       Left Ear: Tympanic membrane and external ear normal       Nose: No " "congestion or rhinorrhea  Mouth/Throat:      Mouth: Mucous membranes are moist       Pharynx: Oropharynx is clear  Eyes:      Pupils: Pupils are equal, round, and reactive to light  Cardiovascular:      Rate and Rhythm: Normal rate and regular rhythm  Pulses: Normal pulses  Heart sounds: Normal heart sounds  Pulmonary:      Effort: Pulmonary effort is normal       Breath sounds: Normal breath sounds  Abdominal:      General: Abdomen is flat  Palpations: Abdomen is soft  There is no mass  Genitourinary:     Penis: Normal and circumcised  Testes: Normal    Musculoskeletal:         General: Normal range of motion  Cervical back: Neck supple  Right hip: Negative right Ortolani and negative right Bowman  Left hip: Negative left Ortolani and negative left Bowman  Lymphadenopathy:      Cervical: No cervical adenopathy  Skin:     Turgor: Normal       Coloration: Skin is not cyanotic, jaundiced or mottled  Findings: No rash  Neurological:      General: No focal deficit present  Mental Status: He is alert  Assessment:     Healthy 2 m o  male  Infant  1  Encounter for well child visit at 3months of age        3  Need for vaccination  DTAP HIB IPV COMBINED VACCINE IM    ROTAVIRUS VACCINE PENTAVALENT 3 DOSE ORAL      3  Screening for depression        4  Subcutaneous nodule of head                 Plan:         1  Anticipatory guidance discussed  Specific topics reviewed: car seat issues, including proper placement, making middle-of-night feeds \"brief and boring\", place in crib before completely asleep, risk of falling once learns to roll, sleep face up to decrease chances of SIDS, smoke detectors, typical  feeding habits and wait to introduce solids until 4-6 months old  2  Development: appropriate for age    1  Immunizations today: per orders  Vaccine Counseling:  The benefits, contraindication and side effects for the following vaccines " were reviewed: Immunization component list: Tetanus, Diphtheria, pertussis, HIB, IPV, rotavirus, Hep B and Prevnar  - Parents have opted to get the Hep B and Prevnar in 2 weeks during a nurse visit    4  Subcutaneous nodule of head, DDx cyst vs lymphnode  Plan to watch closely and if it grows or changes can get an ultrasound  5  Follow-up visit in 2 months for next well child visit, or sooner as needed  Return in 2 weeks for vaccine visit

## 2023-12-14 ENCOUNTER — OFFICE VISIT (OUTPATIENT)
Dept: OBGYN | Facility: CLINIC | Age: 38
End: 2023-12-14
Attending: FAMILY MEDICINE
Payer: COMMERCIAL

## 2023-12-14 VITALS
DIASTOLIC BLOOD PRESSURE: 81 MMHG | WEIGHT: 174 LBS | BODY MASS INDEX: 36.37 KG/M2 | OXYGEN SATURATION: 99 % | HEART RATE: 92 BPM | SYSTOLIC BLOOD PRESSURE: 116 MMHG

## 2023-12-14 DIAGNOSIS — N92.6 MISSED PERIOD: ICD-10-CM

## 2023-12-14 DIAGNOSIS — N91.2 AMENORRHEA: ICD-10-CM

## 2023-12-14 DIAGNOSIS — N91.4 SECONDARY OLIGOMENORRHEA: Primary | ICD-10-CM

## 2023-12-14 LAB
ESTRADIOL SERPL-MCNC: 79 PG/ML
FSH SERPL IRP2-ACNC: 25 MIU/ML
HCG UR QL: NEGATIVE
MIS SERPL-MCNC: 0.1 NG/ML (ref 0.15–7.5)
SHBG SERPL-SCNC: 63 NMOL/L (ref 30–135)

## 2023-12-14 PROCEDURE — 83001 ASSAY OF GONADOTROPIN (FSH): CPT | Performed by: OBSTETRICS & GYNECOLOGY

## 2023-12-14 PROCEDURE — 83520 IMMUNOASSAY QUANT NOS NONAB: CPT | Performed by: OBSTETRICS & GYNECOLOGY

## 2023-12-14 PROCEDURE — 99244 OFF/OP CNSLTJ NEW/EST MOD 40: CPT | Performed by: OBSTETRICS & GYNECOLOGY

## 2023-12-14 PROCEDURE — 36415 COLL VENOUS BLD VENIPUNCTURE: CPT | Performed by: OBSTETRICS & GYNECOLOGY

## 2023-12-14 PROCEDURE — 82670 ASSAY OF TOTAL ESTRADIOL: CPT | Performed by: OBSTETRICS & GYNECOLOGY

## 2023-12-14 PROCEDURE — 84403 ASSAY OF TOTAL TESTOSTERONE: CPT | Performed by: OBSTETRICS & GYNECOLOGY

## 2023-12-14 PROCEDURE — 81025 URINE PREGNANCY TEST: CPT | Performed by: OBSTETRICS & GYNECOLOGY

## 2023-12-14 PROCEDURE — 84270 ASSAY OF SEX HORMONE GLOBUL: CPT | Performed by: OBSTETRICS & GYNECOLOGY

## 2023-12-14 NOTE — PROGRESS NOTES
GYN Clinic Consultation  Date of visit: 2023   Chief Complaint: irregular menses / secondary oligomenorrhea    HPI:   Caleb Talley is a 38 year old  female who I was asked to see in consultation by Rin Hoffmann for evaluation of oligomenorrhea.    Used to get period once a month usually around the 10th of the month.   Menses were regular in  after she had a baby.  Then in  they started getting irregular. Took a medication for a short time to regulate her period. Went to [place] and stopped the medicine. In [place] she went to a doctor they told her that her ovaries are not good.  Menses continue to be irregular - in  only had 2 periods (April, Aug, and Oct); this year  , November only bled 1 day very light and Patient's last menstrual period was 2023 (exact date).   Breasts are sore, sometimes gets hot flushes, sometimes feels cold  Gained small amt of weight  Skin is dry, losing hair  Not taking any medications  Partner lives in [place]. Saw him most recently in Sept  She was working but now she is in school and taking care of her child. States she has been very stressed.  Birthday in chart 1985 and she confirms she was born in .     Reviewed recent labs -  (FSH was not day 3)   Latest Reference Range & Units 23 12:01   FSH mIU/mL 14.7   Hemoglobin A1C 0.0 - 5.6 % 5.4      Latest Reference Range & Units 23 12:01   TSH 0.30 - 4.20 uIU/mL 2.34      Latest Reference Range & Units 23 12:01   Prolactin 5 - 23 ng/mL 5       EXAM: US PELVIC TRANSABDOMINAL AND TRANSVAGINAL  LOCATION: [facility]  DATE: 2023     INDICATION: Amenorrhea.  COMPARISON: Pelvic ultrasound 2022.  TECHNIQUE: Transabdominal scans were performed. Endovaginal ultrasound was performed to better visualize the adnexa.     FINDINGS:  UTERUS: 7.2 x 4.2 x 4.5 cm. Normal in size and position with no masses.  ENDOMETRIUM: 8 mm. Normal smooth endometrium.  RIGHT  OVARY: 3.1 x 2.0 x 3.0 cm. Normal, with 2 dominant follicles measuring 1.4 cm and 1.1 cm, respectively.  LEFT OVARY: 1.8 x 1.0 x 1.0 cm. Normal.  No significant free fluid.                                                                      IMPRESSION:  1.  Normal pelvic ultrasound.      Obstetric History:   OB History    Para Term  AB Living   1 1 1 0 0 1   SAB IAB Ectopic Multiple Live Births   0 0 0 0 1      # Outcome Date GA Lbr Ben/2nd Weight Sex Delivery Anes PTL Lv   1 Term 19 39w4d / 01:09 2.535 kg (5 lb 9.4 oz) M Vag-Spont Nitrous N MARTHA      Name: Arvin      Apgar1: 6  Apgar5: 7         Past Medical History:  Past Medical History:   Diagnosis Date    Blood type A+ 2019    Rubella non-immune status, antepartum 2019       Past Surgical History:  Past Surgical History:   Procedure Laterality Date    NO HISTORY OF SURGERY         Medications:  Current Outpatient Medications   Medication    Prenatal Vit-Fe Fumarate-FA (PRENATAL MULTIVITAMIN W/IRON) 27-0.8 MG tablet    vitamin D3 (CHOLECALCIFEROL) 50 mcg (2000 units) tablet     No current facility-administered medications for this visit.       Allergy:  No Known Allergies  Patient denies food, latex or environmental allergies.     Social History:  Goes to school and homemaker  Lives with child  Social History     Tobacco Use    Smoking status: Never    Smokeless tobacco: Never   Vaping Use    Vaping Use: Never used   Substance Use Topics    Alcohol use: No    Drug use: No        No family history on file.  Denies hx of Breast, ovarian, or colon cancer, sudden death, early cardiovascular or thomboembolic disease. Denies hx or congenital anomalies and autoimmune diseases.      Physical Exam:  Vitals:    23 0940   BP: 116/81   Pulse: 92   SpO2: 99%   Weight: 78.9 kg (174 lb)     Body mass index is 36.37 kg/m .    Gen: healthy, alert, active, no distress     Latest Reference Range & Units 23 09:37   HCG Qual Urine Negative   Negative       Assessment:  Caleb Talley is a 38 year old  who presents in consultation for evaluation of secondary oligomenorrhea. Recent thyroid TSH and prolactin were normal. Recent ultrasound was normal and showed 2 follicles. She is having some hot flushes so recommend repeating FSH with estradiol since she is currently cycle day 3. Discussed if premature ovarian failure I would recommend evaluation of bone density and estrogen supplementation likely with an OCP while she is not in the same country as her . Could also be hypothalamic secondary to stress in which case it may be prudent to evaluate bone density and give hormone therapy if stress reduction does not help.    Plan:  1. Secondary oligomenorrhea  If abnormal I plan to call patient with an  to talk about results.    - Follicle stimulating hormone; Future  - Estradiol; Future  - Anti-Mullerian hormone; Future  - Testosterone Free and Total; Future    2. Missed period    - HCG qualitative urine; Future  - HCG qualitative urine    3. Amenorrhea    - Ob/Gyn  Referral      Due to language barrier, a phone  was used during the history-taking, exam and subsequent discussion with this patient.      Janna Jones MD

## 2023-12-19 ENCOUNTER — TELEPHONE (OUTPATIENT)
Dept: OBGYN | Facility: CLINIC | Age: 38
End: 2023-12-19
Payer: COMMERCIAL

## 2023-12-19 DIAGNOSIS — E28.39 PREMATURE OVARIAN INSUFFICIENCY: ICD-10-CM

## 2023-12-19 DIAGNOSIS — N91.4 SECONDARY OLIGOMENORRHEA: Primary | ICD-10-CM

## 2023-12-19 RX ORDER — ESTRADIOL 2 MG/1
2 TABLET ORAL DAILY
Qty: 90 TABLET | Refills: 3 | Status: SHIPPED | OUTPATIENT
Start: 2023-12-19 | End: 2024-01-31

## 2023-12-19 RX ORDER — PROGESTERONE 200 MG/1
200 CAPSULE ORAL DAILY
Qty: 36 CAPSULE | Refills: 3 | Status: SHIPPED | OUTPATIENT
Start: 2023-12-19 | End: 2024-01-31

## 2023-12-20 LAB
TESTOST FREE SERPL-MCNC: 0.23 NG/DL
TESTOST SERPL-MCNC: 20 NG/DL (ref 8–60)

## 2024-01-05 ENCOUNTER — OFFICE VISIT (OUTPATIENT)
Dept: FAMILY MEDICINE | Facility: CLINIC | Age: 39
End: 2024-01-05
Payer: COMMERCIAL

## 2024-01-05 VITALS
DIASTOLIC BLOOD PRESSURE: 77 MMHG | HEART RATE: 76 BPM | OXYGEN SATURATION: 98 % | TEMPERATURE: 98.6 F | RESPIRATION RATE: 16 BRPM | SYSTOLIC BLOOD PRESSURE: 112 MMHG

## 2024-01-05 DIAGNOSIS — S05.02XA ABRASION OF LEFT CORNEA, INITIAL ENCOUNTER: Primary | ICD-10-CM

## 2024-01-05 PROCEDURE — 99213 OFFICE O/P EST LOW 20 MIN: CPT | Performed by: FAMILY MEDICINE

## 2024-01-05 RX ORDER — OFLOXACIN 3 MG/ML
1-2 SOLUTION/ DROPS OPHTHALMIC 4 TIMES DAILY
Qty: 5 ML | Refills: 0 | Status: SHIPPED | OUTPATIENT
Start: 2024-01-05 | End: 2024-01-09

## 2024-01-05 RX ORDER — ACETAMINOPHEN 500 MG
500-1000 TABLET ORAL EVERY 6 HOURS PRN
Qty: 50 TABLET | Refills: 0 | Status: SHIPPED | OUTPATIENT
Start: 2024-01-05

## 2024-01-05 NOTE — PROGRESS NOTES
SUBJECTIVE: The patient suffered a left corneal abrasion 1 days ago. Mechanism of injury: hit by her son accidentally.    OBJECTIVE: She appears well, vitals are normal. Corneal abrasion noted left eye abou 1x2mm in size medial just inside the ousdie edge of the iris. MOISES, fundi normal.     ASSESSMENT: Corneal abrasion    ICD-10-CM    1. Abrasion of left cornea, initial encounter  S05.02XA ofloxacin (OCUFLOX) 0.3 % ophthalmic solution     acetaminophen (TYLENOL) 500 MG tablet       Use of OTC  meds. discussed follow up discussed. educational info given.   Time of visit > 25minutes greater than half in counseling and coordination of care.

## 2024-01-31 ENCOUNTER — OFFICE VISIT (OUTPATIENT)
Dept: FAMILY MEDICINE | Facility: CLINIC | Age: 39
End: 2024-01-31
Payer: COMMERCIAL

## 2024-01-31 VITALS
WEIGHT: 176.4 LBS | RESPIRATION RATE: 12 BRPM | DIASTOLIC BLOOD PRESSURE: 79 MMHG | BODY MASS INDEX: 39.68 KG/M2 | HEART RATE: 98 BPM | SYSTOLIC BLOOD PRESSURE: 114 MMHG | TEMPERATURE: 98.2 F | HEIGHT: 56 IN | OXYGEN SATURATION: 99 %

## 2024-01-31 DIAGNOSIS — E28.39 PREMATURE OVARIAN INSUFFICIENCY: ICD-10-CM

## 2024-01-31 DIAGNOSIS — N91.4 SECONDARY OLIGOMENORRHEA: ICD-10-CM

## 2024-01-31 DIAGNOSIS — E66.01 MORBID OBESITY (H): ICD-10-CM

## 2024-01-31 PROBLEM — N92.6 IRREGULAR MENSTRUAL BLEEDING: Status: ACTIVE | Noted: 2024-01-31

## 2024-01-31 PROBLEM — O09.899 RUBELLA NON-IMMUNE STATUS, ANTEPARTUM: Status: RESOLVED | Noted: 2019-01-18 | Resolved: 2024-01-31

## 2024-01-31 PROBLEM — Z28.39 RUBELLA NON-IMMUNE STATUS, ANTEPARTUM: Status: RESOLVED | Noted: 2019-01-18 | Resolved: 2024-01-31

## 2024-01-31 PROBLEM — O99.011 ANEMIA DURING PREGNANCY IN FIRST TRIMESTER: Status: RESOLVED | Noted: 2019-01-18 | Resolved: 2024-01-31

## 2024-01-31 PROCEDURE — 99214 OFFICE O/P EST MOD 30 MIN: CPT | Performed by: FAMILY MEDICINE

## 2024-01-31 RX ORDER — ESTRADIOL 2 MG/1
2 TABLET ORAL DAILY
Qty: 90 TABLET | Refills: 3 | Status: SHIPPED | OUTPATIENT
Start: 2024-01-31

## 2024-01-31 RX ORDER — PROGESTERONE 200 MG/1
200 CAPSULE ORAL DAILY
Qty: 36 CAPSULE | Refills: 3 | Status: SHIPPED | OUTPATIENT
Start: 2024-01-31

## 2024-01-31 RX ORDER — CHOLECALCIFEROL (VITAMIN D3) 50 MCG
1 TABLET ORAL DAILY
Qty: 100 TABLET | Refills: 4 | Status: SHIPPED | OUTPATIENT
Start: 2024-01-31

## 2024-01-31 RX ORDER — CHOLECALCIFEROL (VITAMIN D3) 50 MCG
1 TABLET ORAL DAILY
COMMUNITY
Start: 2024-01-31 | End: 2024-01-31

## 2024-01-31 NOTE — PROGRESS NOTES
Assessment & Plan     Morbid obesity (H)  Recommend D3 and rx this    - vitamin D3 (CHOLECALCIFEROL) 50 mcg (2000 units) tablet  Dispense: 100 tablet; Refill: 4    Premature ovarian insufficiency  Discussed at length - she did not understand - this is very new.   Could be candidate for egg donor.    She does not think this would be okay with her culture.   Would like second opinion on fertility.     Refills per [facility]    - progesterone (PROMETRIUM) 200 MG capsule  Dispense: 36 capsule; Refill: 3  - estradiol (ESTRACE) 2 MG tablet  Dispense: 90 tablet; Refill: 3  - Ob/Gyn  Referral    Secondary oligomenorrhea    - progesterone (PROMETRIUM) 200 MG capsule  Dispense: 36 capsule; Refill: 3  - estradiol (ESTRACE) 2 MG tablet  Dispense: 90 tablet; Refill: 3  - Ob/Gyn  Referral      Review of the result(s) of each unique test - see above   30 minutes spent by me on the date of the encounter doing chart review, history and exam, documentation and further activities per the note        CONSULTATION/REFERRAL to fertility specialist   See Patient Instructions    Deshaun Ellis is a 39 year old, presenting for the following health issues:   she has not been able to conceive since her last baby.   Her period are also more irregular.   She saw GYN and had labs done and then was placed on medicine.  Wondering if this medicine will help her get pregnant.   She wonders about the next step     Recheck Medication (Period - discuss findings from OB/Gyn - hormone levels//Discuss next steps)      Component      Latest Ref Rng 12/14/2023  10:20 AM   Free Testosterone Calculated      ng/dL 0.23    Testosterone Total      8 - 60 ng/dL 20    FSH      mIU/mL 25.0    Estradiol      pg/mL 79    Anti-Mullerian Hormone      0.150 - 7.500 ng/mL 0.100 (L)    Sex Hormone Binding Globulin      30 - 135 nmol/L 63       Legend:  (L) Low      1/31/2024     1:02 PM   Additional Questions   Roomed by jae ARTHUR  "      Medication Followup of Progesterone  Taking Medication as prescribed: yes  Side Effects:  None  Medication Helping Symptoms:  yes    Medication Followup of Estradiol  Taking Medication as prescribed: yes  Side Effects:  None  Medication Helping Symptoms:  yes    Past Medical History:   Diagnosis Date    Blood type A+ 01/18/2019    Premature ovarian failure 01/2024    taking estrogen and progesterone    Rubella non-immune status, antepartum 01/18/2019       Past Surgical History:   Procedure Laterality Date    NO HISTORY OF SURGERY         MEDICATIONS:  Current Outpatient Medications   Medication    acetaminophen (TYLENOL) 500 MG tablet    estradiol (ESTRACE) 2 MG tablet    Prenatal Vit-Fe Fumarate-FA (PRENATAL MULTIVITAMIN W/IRON) 27-0.8 MG tablet    progesterone (PROMETRIUM) 200 MG capsule    vitamin D3 (CHOLECALCIFEROL) 50 mcg (2000 units) tablet     No current facility-administered medications for this visit.       SOCIAL HISTORY:  Social History     Tobacco Use    Smoking status: Never    Smokeless tobacco: Never   Substance Use Topics    Alcohol use: No       No family history on file.    Review of Systems  Constitutional, HEENT, cardiovascular, pulmonary, gi and gu systems are negative, except as otherwise noted.      Objective    /79 (BP Location: Right arm, Patient Position: Sitting, Cuff Size: Adult Regular)   Pulse 98   Temp 98.2  F (36.8  C) (Oral)   Resp 12   Ht 1.422 m (4' 8\")   Wt 80 kg (176 lb 6.4 oz)   LMP 01/15/2024 (Approximate)   SpO2 99%   BMI 39.55 kg/m    Body mass index is 39.55 kg/m .  Physical Exam   GENERAL: alert and no distress  EYES: Eyes grossly normal to inspection, PERRL and conjunctivae and sclerae normal  NECK: no adenopathy, no asymmetry, masses, or scars  RESP: lungs clear to auscultation - no rales, rhonchi or wheezes  CV: regular rate and rhythm, normal S1 S2, no S3 or S4, no murmur, click or rub, no peripheral edema  MS: no gross musculoskeletal defects " noted, no edema  SKIN: no suspicious lesions or rashes  NEURO: Normal strength and tone, mentation intact and speech normal  PSYCH: mentation appears normal, affect normal/bright  LYMPH: no cervical, supraclavicular, axillary, or inguinal adenopathy    Office Visit on 12/14/2023   Component Date Value Ref Range Status    hCG Urine Qualitative 12/14/2023 Negative  Negative Final    This test is for screening purposes.  Results should be interpreted along with the clinical picture.  Confirmation testing is available if warranted by ordering [id number], HCG Quantitative Pregnancy.    FSH 12/14/2023 25.0  mIU/mL Final    19 years and older:   Follicular phase: 3.5-12.5 mIU/mL   Ovulation phase: 4.7-21.5 mIU/mL   Luteal phase: 1.7-7.7 mIU/mL   Postmenopause: 25.8-134.8 mIU/mL       Estradiol 12/14/2023 79  pg/mL Final    Healthy Men:   11.3-43.2 pg/mL    Healthy Postmenopausal Women:  Postmenopause: <5-138 pg/mL    Healthy Pregnant Women:  1st trimester: 154-3243 pg/mL  2nd trimester: 1561-12912 pg/mL  3rd trimester: 8525->38968 pg/mL    Healthy Women Cycle Phase:  Follicular: 30.9-90.4 pg/mL  Ovulation: 60.4-533 pg/mL  Luteal: 60.4-232 pg/mL    Healthy Women Cycle Sub-Phase:  Early Follicular: 20.5-62.8 pg/mL  Intermediate Follicular: 26-79.8 pg/mL  Late Follicular: 49.5-233 pg/mL  Ovulation: 60.4-602 pg/mL  Early Luteal: 51.1-179 pg/mL  Intermediate Luteal: 66.5-305 pg/mL  Late Luteal: 30.2-222 pg/mL    Anti-Mullerian Hormone 12/14/2023 0.100 (L)  0.150 - 7.500 ng/mL Final    Sex Hormone Binding Globulin 12/14/2023 63  30 - 135 nmol/L Final    Free Testosterone Calculated 12/14/2023 0.23  ng/dL Final    Adult Female Reference Range:  18-30 Years: 0.08-0.74 ng/dL  31-40 Years: 0.13-0.92 ng/dL  41-51 Years: 0.11-0.58 ng/dL  Postmenopausal: 0.06-0.38 ng/dL    Testosterone Total 12/14/2023 20  8 - 60 ng/dL Final           Signed Electronically by: Rin Hoffmann MD

## 2024-01-31 NOTE — PATIENT INSTRUCTIONS
Current Outpatient Medications   Medication Sig Dispense Refill    acetaminophen (TYLENOL) 500 MG tablet Take 1-2 tablets (500-1,000 mg) by mouth every 6 hours as needed for mild pain 50 tablet 0    estradiol (ESTRACE) 2 MG tablet Take 1 tablet (2 mg) by mouth daily 90 tablet 3    Prenatal Vit-Fe Fumarate-FA (PRENATAL MULTIVITAMIN W/IRON) 27-0.8 MG tablet Take 1 tablet by mouth daily 100 tablet 3    progesterone (PROMETRIUM) 200 MG capsule Take 1 capsule (200 mg) by mouth daily Take for the first 12 days of the month. 36 capsule 3

## 2024-02-19 ENCOUNTER — TELEPHONE (OUTPATIENT)
Dept: OBGYN | Facility: CLINIC | Age: 39
End: 2024-02-19
Payer: COMMERCIAL

## 2024-02-19 DIAGNOSIS — N91.4 SECONDARY OLIGOMENORRHEA: Primary | ICD-10-CM

## 2024-02-19 NOTE — TELEPHONE ENCOUNTER
1. Secondary oligomenorrhea  She is cycle day 3 today. Okay for labs tomorrow.   - Follicle stimulating hormone; Future  - Anti-Mullerian hormone; Future  - Estradiol; Future    Janna Jones MD

## 2024-02-19 NOTE — TELEPHONE ENCOUNTER
Received call from patient.  Scheduled for hormone labs   Patient due for labs on cycle day 3.  Patient period started 2/17 - currently on cycle day 3    NEEDS time sensitive lab appointment tomorrow.  Will need to clarify with [facility] that labs are needed tomorrow due to cycle day.    Joyce Haile RN

## 2024-02-20 NOTE — CONFIDENTIAL NOTE
[facility] Call Center    Phone Message    May a detailed message be left on voicemail: yes     Reason for Call: Judy -  is calling as pt is scheduled today for a lab. However, are requires 24 hours for a ride and pt is having trouble getting them to understand/getting something to work within the 3 day window that test is needed during her period as it was the weekend when she got her period.  is trying to help for the pt but wondering if we can call [facility] to explain to them why pt needs ride or if pt can reschedule labs to   - 1pm today   - Thursday   - or it will need to be done next month  [facility]: [phone]    Please call pt to confirm/reschedule [phone].   [facility] at number above if we're able to.  And  if we have any questions/to help with pt.    Thank you    Action Taken: Message routed to:  Other: women's    Travel Screening: Not Applicable

## 2024-02-20 NOTE — TELEPHONE ENCOUNTER
Called [facility] and was able to convince them to get the pt to the lab today.  Pt was called with an  and discussed that she will have to wait until next cycle and call the first day of your cycle to get set up for the ride.  Pt verbalized understanding.

## 2024-02-29 ENCOUNTER — OFFICE VISIT (OUTPATIENT)
Dept: OBGYN | Facility: CLINIC | Age: 39
End: 2024-02-29
Attending: FAMILY MEDICINE
Payer: COMMERCIAL

## 2024-02-29 VITALS
HEART RATE: 77 BPM | SYSTOLIC BLOOD PRESSURE: 129 MMHG | BODY MASS INDEX: 40.2 KG/M2 | TEMPERATURE: 97 F | WEIGHT: 178.7 LBS | DIASTOLIC BLOOD PRESSURE: 82 MMHG | HEIGHT: 56 IN

## 2024-02-29 DIAGNOSIS — E28.39 PREMATURE OVARIAN INSUFFICIENCY: Primary | ICD-10-CM

## 2024-02-29 PROCEDURE — 83001 ASSAY OF GONADOTROPIN (FSH): CPT | Performed by: OBSTETRICS & GYNECOLOGY

## 2024-02-29 PROCEDURE — 82166 ASSAY ANTI-MULLERIAN HORM: CPT | Performed by: OBSTETRICS & GYNECOLOGY

## 2024-02-29 PROCEDURE — 82670 ASSAY OF TOTAL ESTRADIOL: CPT | Performed by: OBSTETRICS & GYNECOLOGY

## 2024-02-29 PROCEDURE — 99214 OFFICE O/P EST MOD 30 MIN: CPT | Performed by: OBSTETRICS & GYNECOLOGY

## 2024-02-29 PROCEDURE — 36415 COLL VENOUS BLD VENIPUNCTURE: CPT | Performed by: OBSTETRICS & GYNECOLOGY

## 2024-02-29 NOTE — PROGRESS NOTES
"S; Caleb Talley is a 39 year old  who presents for second opinion regarding recent diagnosis of premature ovarian failure.  She saw Dr. Jones about 3 months ago with complaints of menses becoming more and more infrequent, with only 3 or 4 in the past year.  In addition she was experiencing hot flashes and some sleep difficulty. Evaluation was performed which showed a high FSH and low AMH suggesting POF.  She asked to have labs repeat and they were, which showed same or worse results.      She was started on estrace and prometrium and since then she reports feeling so much better.the symptoms of hot flashes, etc have resolved and she has now been having periods.  She is wondering if this means she can get pregnant.  She would like to repeat her labwork now that she is feeling better.  Her  is in [place], she has no plans to go anytime in the near future and will likely only go to see him if she can get pregnant.    Visit notes with Dr. Jones, lab results reviewed on day of visit.    O: /82   Pulse 77   Temp 97  F (36.1  C)   Ht 1.422 m (4' 8\")   Wt 81.1 kg (178 lb 11.2 oz)   LMP 2024 (Exact Date)   BMI 40.06 kg/m      Gen: NAD    A/P: Premature ovarian insufficiency   We reviewed her menstrual and symptomatic history as well as her recent lab work.  I explained that the reason her symptoms and periods have improved is due to the hormones she is taking.  I explained that they are giving her the hormones her ovaries were not producing, which in turn was causing her symptoms.    I explained that it is very rare for women with this diagnosis to conceive spontaneously, although it is not impossible. The most likely way to achieve a pregnancy would via IVF, but [facility] most likely would recommend egg donor, which is not something she would do for [facility] reasons.  She may be interested in consultation with [facility] just to understand better the actual chances for pregnancy with her own eggs.  I " did let her know that IVF could only be done if  was here and given sperm, unless they used a sperm donor.  She verbalizes understanding.  She does have lab orders in already, so it is ok to recheck if she desires. Once those results are back, she may consider [facility] referral, will let us know.    TREVOR GUEVARA MD

## 2024-03-01 DIAGNOSIS — N91.4 SECONDARY OLIGOMENORRHEA: ICD-10-CM

## 2024-03-01 DIAGNOSIS — E28.39 PREMATURE OVARIAN INSUFFICIENCY: Primary | ICD-10-CM

## 2024-03-01 LAB
ESTRADIOL SERPL-MCNC: 66 PG/ML
FSH SERPL IRP2-ACNC: 28.3 MIU/ML
MIS SERPL-MCNC: <0.03 NG/ML (ref 0.15–7.5)

## 2024-03-01 NOTE — LETTER
3/1/2024        RE: Caleb Talley  [address]  [place] [address]        Inocente Ellis,     Below is the list of fertility clinics we often recommend in the [place] are. [facility]   [email or web address]      [place] location:  [facility]  [address], [address]  [place] [address]  [phone]      [place] location:  [facility]  [address], [address]  [place], [place] [id number] [phone]  __________________________________________________     [facility]  [address] [place]  [phone]  __________________________________________________     [facility]  [email or web address]     [place] Location:  [address], [address]  [place] [address]      [place] Location:  [address], [address]  [place], [place] [id number] [phone]            Sincerely,        Kassandra Jane MD

## 2024-03-06 NOTE — PROGRESS NOTES
Fertility clinic information and referral sent to patient home address on file.    Joyce Haile RN

## 2024-03-11 ENCOUNTER — TELEPHONE (OUTPATIENT)
Dept: OBGYN | Facility: CLINIC | Age: 39
End: 2024-03-11
Payer: COMMERCIAL

## 2024-03-11 ENCOUNTER — APPOINTMENT (OUTPATIENT)
Dept: INTERPRETER SERVICES | Facility: CLINIC | Age: 39
End: 2024-03-11
Payer: COMMERCIAL

## 2024-03-11 DIAGNOSIS — N91.4 SECONDARY OLIGOMENORRHEA: ICD-10-CM

## 2024-03-11 DIAGNOSIS — E28.39 PREMATURE OVARIAN INSUFFICIENCY: Primary | ICD-10-CM

## 2024-03-11 NOTE — TELEPHONE ENCOUNTER
Day 3 labs ordered  1. Premature ovarian insufficiency    - Follicle stimulating hormone; Future  - Estradiol; Future  - Mullerian Hormone Antibody; Future    2. Secondary oligomenorrhea    - Follicle stimulating hormone; Future  - Estradiol; Future  - Mullerian Hormone Antibody; Future    Janna Jones MD

## 2024-03-11 NOTE — TELEPHONE ENCOUNTER
Called pt to ask about requested lab orders  Pt had labs done on 2/29/24  Pt states she was told she should call back on day 2 of her cycle to get labs done.     RN doesn't see any notes for repeating labs, but pt got last set of labs done at the end of her cycle.   She said if MD says she doesn't need them and the old labs are enough then that's fine but they weren't on her right cycle day  Routing to provider to advise.  Lynn Jolly RN on 3/11/2024 at 3:13 PM

## 2024-03-11 NOTE — TELEPHONE ENCOUNTER
[facility] Call Center    Phone Message    May a detailed message be left on voicemail: yes     Reason for Call: Other: Judy is the  calling about lab orders for patient. Writer not seeing lab orders available. Please reach out. Thank you     Action Taken: Message routed to:  [facility]    Travel Screening: Not Applicable

## 2024-03-11 NOTE — TELEPHONE ENCOUNTER
Called pt with [facility] ,   Instructed her to schedule CD3 labs  RN able to schedule for lab only visit at [facility] tomorrow where she is also taking her son for an appointment  Pt asked about her progesterone that she was done with her 3 months, explained how to get her refills available at her pharmacy.   Will notify pt once results come back tomorrow.    Pt verbalized understanding, in agreement with plan, and voiced no further questions.  Lynn Jolly RN on 3/11/2024 at 4:12 PM

## 2024-03-12 ENCOUNTER — LAB (OUTPATIENT)
Dept: LAB | Facility: CLINIC | Age: 39
End: 2024-03-12
Payer: COMMERCIAL

## 2024-03-12 DIAGNOSIS — N91.4 SECONDARY OLIGOMENORRHEA: ICD-10-CM

## 2024-03-12 DIAGNOSIS — E28.39 PREMATURE OVARIAN INSUFFICIENCY: ICD-10-CM

## 2024-03-12 LAB
ESTRADIOL SERPL-MCNC: 78 PG/ML
FSH SERPL IRP2-ACNC: 20.4 MIU/ML

## 2024-03-12 PROCEDURE — 82670 ASSAY OF TOTAL ESTRADIOL: CPT

## 2024-03-12 PROCEDURE — 83001 ASSAY OF GONADOTROPIN (FSH): CPT

## 2024-03-12 PROCEDURE — 36415 COLL VENOUS BLD VENIPUNCTURE: CPT

## 2024-03-12 PROCEDURE — 99000 SPECIMEN HANDLING OFFICE-LAB: CPT

## 2024-03-12 PROCEDURE — 82166 ASSAY ANTI-MULLERIAN HORM: CPT | Mod: 90

## 2024-03-13 LAB
Lab: NORMAL
PERFORMING LABORATORY: NORMAL
SPECIMEN STATUS: NORMAL
TEST NAME: NORMAL

## 2024-03-15 LAB — MISCELLANEOUS TEST 1 (ARUP): ABNORMAL

## 2024-06-05 ENCOUNTER — OFFICE VISIT (OUTPATIENT)
Dept: FAMILY MEDICINE | Facility: CLINIC | Age: 39
End: 2024-06-05
Payer: COMMERCIAL

## 2024-06-05 VITALS
HEART RATE: 92 BPM | DIASTOLIC BLOOD PRESSURE: 76 MMHG | WEIGHT: 178 LBS | TEMPERATURE: 98.2 F | RESPIRATION RATE: 18 BRPM | BODY MASS INDEX: 39.91 KG/M2 | OXYGEN SATURATION: 98 % | SYSTOLIC BLOOD PRESSURE: 111 MMHG

## 2024-06-05 DIAGNOSIS — M79.18 MUSCULOSKELETAL PAIN: Primary | ICD-10-CM

## 2024-06-05 PROCEDURE — 99213 OFFICE O/P EST LOW 20 MIN: CPT

## 2024-06-05 RX ORDER — NAPROXEN 500 MG/1
500 TABLET ORAL 2 TIMES DAILY WITH MEALS
Qty: 30 TABLET | Refills: 0 | Status: SHIPPED | OUTPATIENT
Start: 2024-06-05

## 2024-06-05 RX ORDER — CYCLOBENZAPRINE HCL 10 MG
10 TABLET ORAL 3 TIMES DAILY PRN
Qty: 30 TABLET | Refills: 0 | Status: SHIPPED | OUTPATIENT
Start: 2024-06-05

## 2024-06-05 RX ORDER — NAPROXEN 500 MG/1
500 TABLET ORAL 2 TIMES DAILY WITH MEALS
Qty: 30 TABLET | Refills: 0 | Status: SHIPPED | OUTPATIENT
Start: 2024-06-05 | End: 2024-06-05

## 2024-06-05 RX ORDER — CYCLOBENZAPRINE HCL 10 MG
10 TABLET ORAL 3 TIMES DAILY PRN
Qty: 30 TABLET | Refills: 0 | Status: SHIPPED | OUTPATIENT
Start: 2024-06-05 | End: 2024-06-05

## 2024-06-05 NOTE — PROGRESS NOTES
URGENT CARE  Assessment & Plan   Assessment:   Caleb Talley is a 39 year old female who's clinical presentation today is consistent with:   1. Musculoskeletal pain  - cyclobenzaprine (FLEXERIL) 10 MG tablet;   - naproxen (NAPROSYN) 500 MG tablet;   Plan:  will treat patient at this time symptomatically and supportively, this will include encouraging: using NSAIDs/Tylenol and muscle relaxant to help decrease pain and inflammation, resting, applying ice/heat as needed  Educated patient to follow-up with their PCP or ortho in the next 1-2 weeks for further evaluation and reassessment, and due to the possibility of an occult fracture} also discussed to return immediatly  if symptoms worsen after today's visit.   No alarm signs or symptoms present   Differential Diagnoses for this patient's chief complaint that I considered include:  fracture, dislocation, Ligamentous vs tendon pathology, musculoskeletal injury, soft tissue injury     Patient is} agreeable to treatment plan and state they will follow-up if symptoms do not improve and/or if symptoms worsen   see patient's AVS 'monitor for' section for specific patient instructions given and discussed regarding what to watch for and when to follow up    CRISTINA Garcia [facility]      ______________________________________________________________________      Subjective     HPI: Caleb Talley  is a 39 year old  female who presents today for evaluation the following concerns:   Patient presents with right sided neck and  back pain which started yesterday    She relates precipitating factor related to back pain is: cleaning her house    Patient states this is acute.   Patient endorses pain that is  radiating down her right arm a bit to her shoulder, shoulder blade     Patient denies any changes of bowel or bladder. Patient denies any numbness or tingling   The patient has tried nothing yet to help alleviate the pain      Review of  Systems:  Pertinent review of systems as reflected in HPI, otherwise negative.     Objective    Physical Exam:  Vitals:    06/05/24 1809   BP: 111/76   BP Location: Left arm   Patient Position: Sitting   Cuff Size: Adult Regular   Pulse: 92   Resp: 18   Temp: 98.2  F (36.8  C)   TempSrc: Oral   SpO2: 98%   Weight: 80.7 kg (178 lb)      General:   alert and oriented, no acute distress, non ill-appearing   Vital signs reviewed: afebrile and normotensive     Psy/mental status: pleasant   msk:   right shoulder/ back /neck:   No  erythema, ecchymosis/bruising, or   inflammation present on  the superior, lateral, anterior, posterior} aspect   Increased tenderness/pain with palpation on the trapezius muscles on right posterior shoulder and right side of neck paravertebral muscles - reproducible   No  decreased range of motion of RUE   Strength +5,  strength normal.   Temperature equal} to body temperature.    No crepitus, no gross deformity, joint laxity, skin intact, and no laceration(s) present.   Radial pulse is +2  No numbness or tingling    ______________________________________________________________________    I explained my diagnostic considerations and recommendations to the patient, who voiced understanding and agreement with the treatment plan.   All questions were answered.   We discussed potential side effects, risks and benefits of any prescribed or recommended therapies, as well as expectations for response to treatments.  Please see AVS for any patient instructions & handouts given.   Patient was advised to contact the Nurse Care Line, their Primary Care provider, Urgent Care, or the Emergency Department if there are new or worsening symptoms, or call 911 for emergencies.

## 2024-06-19 ENCOUNTER — TELEPHONE (OUTPATIENT)
Dept: FAMILY MEDICINE | Facility: CLINIC | Age: 39
End: 2024-06-19
Payer: COMMERCIAL

## 2024-06-19 DIAGNOSIS — E28.39 PREMATURE OVARIAN INSUFFICIENCY: Primary | ICD-10-CM

## 2024-07-22 ENCOUNTER — APPOINTMENT (OUTPATIENT)
Dept: INTERPRETER SERVICES | Facility: CLINIC | Age: 39
End: 2024-07-22
Payer: COMMERCIAL

## 2024-07-24 ENCOUNTER — OFFICE VISIT (OUTPATIENT)
Dept: FAMILY MEDICINE | Facility: CLINIC | Age: 39
End: 2024-07-24
Payer: COMMERCIAL

## 2024-07-24 VITALS
HEIGHT: 59 IN | HEART RATE: 82 BPM | BODY MASS INDEX: 31.65 KG/M2 | WEIGHT: 157 LBS | TEMPERATURE: 97.5 F | DIASTOLIC BLOOD PRESSURE: 72 MMHG | SYSTOLIC BLOOD PRESSURE: 108 MMHG | OXYGEN SATURATION: 100 % | RESPIRATION RATE: 15 BRPM

## 2024-07-24 DIAGNOSIS — Z23 NEED FOR HEPATITIS A IMMUNIZATION: ICD-10-CM

## 2024-07-24 DIAGNOSIS — Z71.84 ENCOUNTER FOR COUNSELING FOR TRAVEL: Primary | ICD-10-CM

## 2024-07-24 DIAGNOSIS — Z23 NEED FOR IMMUNIZATION AGAINST TYPHOID: ICD-10-CM

## 2024-07-24 PROCEDURE — 99401 PREV MED CNSL INDIV APPRX 15: CPT | Mod: 25 | Performed by: PHYSICIAN ASSISTANT

## 2024-07-24 PROCEDURE — 90471 IMMUNIZATION ADMIN: CPT | Performed by: PHYSICIAN ASSISTANT

## 2024-07-24 PROCEDURE — 90691 TYPHOID VACCINE IM: CPT | Performed by: PHYSICIAN ASSISTANT

## 2024-07-24 PROCEDURE — 90472 IMMUNIZATION ADMIN EACH ADD: CPT | Performed by: PHYSICIAN ASSISTANT

## 2024-07-24 PROCEDURE — 90632 HEPA VACCINE ADULT IM: CPT | Performed by: PHYSICIAN ASSISTANT

## 2024-07-24 RX ORDER — MEFLOQUINE HYDROCHLORIDE 250 MG/1
TABLET ORAL
Qty: 15 TABLET | Refills: 0 | Status: SHIPPED | OUTPATIENT
Start: 2024-07-24

## 2024-07-24 RX ORDER — AZITHROMYCIN 500 MG/1
TABLET, FILM COATED ORAL
Qty: 9 TABLET | Refills: 0 | Status: SHIPPED | OUTPATIENT
Start: 2024-07-24

## 2024-07-24 NOTE — PROGRESS NOTES
SUBJECTIVE: Caleb Talley , a 39 year old  female, presents for counseling and information regarding upcoming travel to [facility]. Special medical concerns include: none. She anticipates the following unusual exposures: none.    Itinerary:  [facility]    Departure Date: 8-3-24 Return date: 10-18-24    Reason for travel (i.e. Business, pleasure): Family    Visiting an urban or rural area?: urban    Accommodations (i.e. hotel, hostel, friends, family, etc): Hotel    IMMUNIZATION HISTORY  Have you received any vaccinations in the past 4 weeks? If so, which? No  Have you ever fainted from having your blood drawn or from an injection?  No  Have you ever had any bad reaction or side effect from any vaccination?  If so, which? No  Do you live (or work closely) with anyone who has AIDS, an AIDS-like condition, any other immune disorder or who is on chemotherapy for cancer?  No  Have you received any injection of immune globulin or any blood products during the past 12 months?  No    GENERAL MEDICAL HISTORY  Do you have a medical condition that requires medicine or doctor follow-up visits?  No  Do you have a medical condition that is stable now, but that may recur while traveling?  No  Has your spleen been removed?  No  Have you had an illness or a fever in the past 48 hours?  No  Are you pregnant, or might you become pregnant on this trip?  Any chance of pregnancy?  No  Are you breastfeeding?  No  Do you have HIV, AIDS, an AIDS-like condition, any other immune disorder, leukemia or cancer?  No  Have you had your thymus gland removed or history of problems with your thymus, such as myasthenia gravis, DiGeorge syndrome, or thymoma?  No  Do you have a severely low platelet count (thrombocytopenia) or a blood clotting disorder?  No  Have you ever had a convulsion, seizure, epilepsy, neurologic condition or brain infection?  No  Do you have any stomach conditions?  No  Do you have severe renal or kidney impairment?  No  Do you  have a history of mental health concerns?  No  Do you get yeast infections often?  No  Do you have psoriasis?  No  Do you get motion sickness?  No  Have you ever had headaches, nausea, vomiting, or breathing problems from altitude exposure?  No    MEDICINES  Are you taking:   Steroids, prednisone, anti-cancer drugs, or medicines that suppress your immune system? No  Antibiotics or sulfonamides? No  Oral contraceptives (birth control pills)? No  Aspirin therapy (children and teens)? No    ALLERGIES  Are you allergic to:  Any medicines? No  Any foods or other? No  Neomycin, formalin, or fish products? No      Past Medical History:   Diagnosis Date    Anemia during pregnancy in first trimester     Blood type A+ 01/18/2019    Premature ovarian failure 01/2024    taking estrogen and progesterone    Rubella non-immune status, antepartum 01/18/2019      Immunization History   Administered Date(s) Administered    COVID-19 MONOVALENT 12+ (Pfizer) 09/27/2021, 10/18/2021    Influenza Vaccine >6 months,quad, PF 11/29/2023    MMR 05/30/2019    TDAP Vaccine (Adacel) 03/06/2019       Current Outpatient Medications   Medication Sig Dispense Refill    acetaminophen (TYLENOL) 500 MG tablet Take 1-2 tablets (500-1,000 mg) by mouth every 6 hours as needed for mild pain 50 tablet 0    cyclobenzaprine (FLEXERIL) 10 MG tablet Take 1 tablet (10 mg) by mouth 3 times daily as needed for muscle spasms 30 tablet 0    estradiol (ESTRACE) 2 MG tablet Take 1 tablet (2 mg) by mouth daily 90 tablet 3    naproxen (NAPROSYN) 500 MG tablet Take 1 tablet (500 mg) by mouth 2 times daily (with meals) 30 tablet 0    Prenatal Vit-Fe Fumarate-FA (PRENATAL MULTIVITAMIN W/IRON) 27-0.8 MG tablet Take 1 tablet by mouth daily 100 tablet 3    progesterone (PROMETRIUM) 200 MG capsule Take 1 capsule (200 mg) by mouth daily Take for the first 12 days of the month. 36 capsule 3    vitamin D3 (CHOLECALCIFEROL) 250 mcg (96569 units) capsule Take 1 capsule (250 mcg) by  mouth once a week 12 capsule 4    vitamin D3 (CHOLECALCIFEROL) 50 mcg (2000 units) tablet Take 1 tablet (50 mcg) by mouth daily 100 tablet 4     No Known Allergies     EXAM: deferred    Immunizations discussed include: Hepatitis A and Typhoid  Malaria prophylaxis recommended: mefloquine  Symptomatic treatment for traveler's diarrhea: bismuth subsalicylate, loperamide/diphenoxylate, and azithromycin    ASSESSMENT/PLAN:    (Z71.84) Encounter for counseling for travel  (primary encounter diagnosis)    Comment: Hepatitis A and typhoid vaccines today. Patient will return or follow-up with PCP as needed. Prophylaxis given for Traveler's diarrhea and Malaria. All questions were answered.     Plan: mefloquine (LARIAM) 250 MG tablet, azithromycin        (ZITHROMAX) 500 MG tablet            (Z23) Need for hepatitis A immunization  Comment:   Plan: HEPATITIS A 19+ (HAVRIX/VAQTA)            (Z23) Need for immunization against typhoid  Comment:   Plan: TYPHOID VACCINE, IM              I have reviewed general recommendations for safe travel   including: food/water precautions, insect avoidance, safe sex   practices given high prevalence of HIV and other STDs,   roadway safety. Educational materials and links to the CDC   Traveler's health website have been provided.    Total time 12 minutes, greater than 50 percent in counseling   and coordination of care.

## 2024-07-24 NOTE — PATIENT INSTRUCTIONS
"See travel packet provided  Recommend ultrathon (mosquito repellant), pepto bismol and imodium  The food and drink choices you make while traveling can impact your likelihood of getting sick.   If you aren't sure if a food or drink is safe, the saying \" BOIL IT, COOK IT, PEEL IT, OR FORGET IT\" can help you decide whether it's okay to consume.   Also bring hand  and sun screen with you.  Safe Travels     If you first start to get mild to moderate diarrhea, take imodium.      If diarrhea is severe or you have a fever with the diarrhea, take the antibiotic (azithromycin).      Today July 24, 2024 you received the    Hepatitis A Vaccine - Please return on 1/24/25 or later for your 2nd and final dose.    Typhoid - injectable. This vaccine is valid for two years. .    These appointments can be made as a NURSE ONLY visit.    **It is very important for the vaccinations to be given on the scheduled day(s), this helps ensure you receive the full effectiveness of the vaccine.**    Please call [facility] with any questions [phone]    Thank you for visiting [facility]      "

## 2024-07-24 NOTE — NURSING NOTE
Prior to immunization administration, verified patients identity using patient s name and date of birth. Please see Immunization Activity for additional information.     Screening Questionnaire for Adult Immunization    Are you sick today?   No   Do you have allergies to medications, food, a vaccine component or latex?   No   Have you ever had a serious reaction after receiving a vaccination?   No   Do you have a long-term health problem with heart, lung, kidney, or metabolic disease (e.g., diabetes), asthma, a blood disorder, no spleen, complement component deficiency, a cochlear implant, or a spinal fluid leak?  Are you on long-term aspirin therapy?   No   Do you have cancer, leukemia, HIV/AIDS, or any other immune system problem?   No   Do you have a parent, brother, or sister with an immune system problem?   No   In the past 3 months, have you taken medications that affect  your immune system, such as prednisone, other steroids, or anticancer drugs; drugs for the treatment of rheumatoid arthritis, Crohn s disease, or psoriasis; or have you had radiation treatments?   No   Have you had a seizure, or a brain or other nervous system problem?   No   During the past year, have you received a transfusion of blood or blood    products, or been given immune (gamma) globulin or antiviral drug?   No   For women: Are you pregnant or is there a chance you could become       pregnant during the next month?   No   Have you received any vaccinations in the past 4 weeks?   No     Immunization questionnaire answers were all negative.      Patient instructed to remain in clinic for 15 minutes afterwards, and to report any adverse reactions.     Screening performed by Cherelle Maxwell on 7/24/2024 at 9:46 AM.

## 2024-07-25 ENCOUNTER — VIRTUAL VISIT (OUTPATIENT)
Dept: INTERPRETER SERVICES | Facility: CLINIC | Age: 39
End: 2024-07-25

## 2024-07-25 ENCOUNTER — OFFICE VISIT (OUTPATIENT)
Dept: OBGYN | Facility: CLINIC | Age: 39
End: 2024-07-25
Payer: COMMERCIAL

## 2024-07-25 VITALS
WEIGHT: 157 LBS | HEIGHT: 59 IN | OXYGEN SATURATION: 97 % | SYSTOLIC BLOOD PRESSURE: 105 MMHG | HEART RATE: 48 BPM | BODY MASS INDEX: 31.65 KG/M2 | DIASTOLIC BLOOD PRESSURE: 78 MMHG

## 2024-07-25 DIAGNOSIS — N91.4 SECONDARY OLIGOMENORRHEA: Primary | ICD-10-CM

## 2024-07-25 DIAGNOSIS — E55.9 VITAMIN D DEFICIENCY: ICD-10-CM

## 2024-07-25 DIAGNOSIS — Z31.69 ENCOUNTER FOR PRECONCEPTION CONSULTATION: ICD-10-CM

## 2024-07-25 LAB
ALBUMIN SERPL BCG-MCNC: 4.5 G/DL (ref 3.5–5.2)
ALP SERPL-CCNC: 62 U/L (ref 40–150)
ALT SERPL W P-5'-P-CCNC: 15 U/L (ref 0–50)
ANION GAP SERPL CALCULATED.3IONS-SCNC: 11 MMOL/L (ref 7–15)
AST SERPL W P-5'-P-CCNC: 30 U/L (ref 0–45)
BILIRUB SERPL-MCNC: 0.2 MG/DL
BUN SERPL-MCNC: 7.5 MG/DL (ref 6–20)
CALCIUM SERPL-MCNC: 9.4 MG/DL (ref 8.8–10.4)
CHLORIDE SERPL-SCNC: 100 MMOL/L (ref 98–107)
CREAT SERPL-MCNC: 0.53 MG/DL (ref 0.51–0.95)
EGFRCR SERPLBLD CKD-EPI 2021: >90 ML/MIN/1.73M2
ESTRADIOL SERPL-MCNC: 72 PG/ML
FSH SERPL IRP2-ACNC: 70.7 MIU/ML
GLUCOSE SERPL-MCNC: 100 MG/DL (ref 70–99)
HBA1C MFR BLD: 5.1 % (ref 0–5.6)
HCO3 SERPL-SCNC: 27 MMOL/L (ref 22–29)
MIS SERPL-MCNC: <0.03 NG/ML (ref 0.15–7.5)
POTASSIUM SERPL-SCNC: 4.4 MMOL/L (ref 3.4–5.3)
PROGEST SERPL-MCNC: 0.3 NG/ML
PROT SERPL-MCNC: 8.5 G/DL (ref 6.4–8.3)
SODIUM SERPL-SCNC: 138 MMOL/L (ref 135–145)
TSH SERPL DL<=0.005 MIU/L-ACNC: 3.34 UIU/ML (ref 0.3–4.2)
VIT D+METAB SERPL-MCNC: 50 NG/ML (ref 20–50)

## 2024-07-25 PROCEDURE — 82166 ASSAY ANTI-MULLERIAN HORM: CPT | Performed by: OBSTETRICS & GYNECOLOGY

## 2024-07-25 PROCEDURE — 83036 HEMOGLOBIN GLYCOSYLATED A1C: CPT | Performed by: OBSTETRICS & GYNECOLOGY

## 2024-07-25 PROCEDURE — T1013 SIGN LANG/ORAL INTERPRETER: HCPCS | Mod: U4

## 2024-07-25 PROCEDURE — 83001 ASSAY OF GONADOTROPIN (FSH): CPT | Performed by: OBSTETRICS & GYNECOLOGY

## 2024-07-25 PROCEDURE — 82306 VITAMIN D 25 HYDROXY: CPT | Performed by: OBSTETRICS & GYNECOLOGY

## 2024-07-25 PROCEDURE — 36415 COLL VENOUS BLD VENIPUNCTURE: CPT | Performed by: OBSTETRICS & GYNECOLOGY

## 2024-07-25 PROCEDURE — 82670 ASSAY OF TOTAL ESTRADIOL: CPT | Performed by: OBSTETRICS & GYNECOLOGY

## 2024-07-25 PROCEDURE — 99214 OFFICE O/P EST MOD 30 MIN: CPT | Performed by: OBSTETRICS & GYNECOLOGY

## 2024-07-25 PROCEDURE — G2211 COMPLEX E/M VISIT ADD ON: HCPCS | Performed by: OBSTETRICS & GYNECOLOGY

## 2024-07-25 PROCEDURE — 80053 COMPREHEN METABOLIC PANEL: CPT | Performed by: OBSTETRICS & GYNECOLOGY

## 2024-07-25 PROCEDURE — 84144 ASSAY OF PROGESTERONE: CPT | Performed by: OBSTETRICS & GYNECOLOGY

## 2024-07-25 PROCEDURE — 84443 ASSAY THYROID STIM HORMONE: CPT | Performed by: OBSTETRICS & GYNECOLOGY

## 2024-07-25 RX ORDER — PRENATAL VIT/IRON FUM/FOLIC AC 27MG-0.8MG
1 TABLET ORAL DAILY
Qty: 90 TABLET | Refills: 3 | Status: SHIPPED | OUTPATIENT
Start: 2024-07-25

## 2024-07-25 NOTE — PROGRESS NOTES
GYN Clinic Visit  Date of visit: 2024   Chief Complaint: hormone check    HPI:   Caleb Talley is a 39 year old  female who presents in follow up for secondary oligomenorrhea and premature ovarian insufficiency.     Last 2 cycles came on time.May was the last time she took the hormonal medications. States she stopped them in May.  and July she had periods on her own. Patient's last menstrual period was 2024 (exact date). Wants to try to conceive. 8/3-10/17, will be out of the country bc her mother is sick. Her  is where her mother is.     States she has been exercising more. Changed her diet and worked on her weight. Was 177lb, now weighs 155lb    Reviewed previous labs with her     Latest Reference Range & Units 23 12:01 23 10:20 24 10:27 24 11:36   Estradiol pg/mL  79 66 78   Free Testosterone Calculated ng/dL  0.23     FSH mIU/mL 14.7 25.0 28.3 20.4      Latest Reference Range & Units 23 10:20 24 10:27   Anti-Mullerian Hormone 0.150 - 7.500 ng/mL 0.100 (L) <0.030 (L)   (L): Data is abnormally low     Latest Reference Range & Units 23 11:06 23 12:01   TSH 0.30 - 4.20 uIU/mL 3.27 2.34      Latest Reference Range & Units 19 15:44 23 12:01   Hemoglobin A1C 0.0 - 5.6 % 5.0 5.4      Latest Reference Range & Units 23 10:20   Free Testosterone Calculated ng/dL 0.23      Latest Reference Range & Units 23 11:06   Vitamin D Deficiency screening 20 - 75 ug/L 44       Obstetric History:   OB History    Para Term  AB Living   1 1 1 0 0 1   SAB IAB Ectopic Multiple Live Births   0 0 0 0 1      # Outcome Date GA Lbr Ben/2nd Weight Sex Type Anes PTL Lv   1 Term 19 39w4d / 01:09 2.535 kg (5 lb 9.4 oz) M Vag-Spont Nitrous N MARTHA      Name: Arvin      Apgar1: 6  Apgar5: 7         Gynecologic History:  Patient's last menstrual period was 2024 (exact date).  STI history: none  Last pap: 23 ASCUS, neg HR  HPV    Past Medical History:  Past Medical History:   Diagnosis Date    Anemia during pregnancy in first trimester     Blood type A+ 01/18/2019    Premature ovarian failure 01/2024    taking estrogen and progesterone    Rubella non-immune status, antepartum 01/18/2019       Past Surgical History:  Past Surgical History:   Procedure Laterality Date    NO HISTORY OF SURGERY           Medications:  Current Outpatient Medications   Medication Sig Dispense Refill    Prenatal Vit-Fe Fumarate-FA (PRENATAL MULTIVITAMIN W/IRON) 27-0.8 MG tablet Take 1 tablet by mouth daily 90 tablet 3    acetaminophen (TYLENOL) 500 MG tablet Take 1-2 tablets (500-1,000 mg) by mouth every 6 hours as needed for mild pain 50 tablet 0    azithromycin (ZITHROMAX) 500 MG tablet Take one tablet daily for up to 3 days as needed for traveler's diarrhea. May repeat if needed. 9 tablet 0    cyclobenzaprine (FLEXERIL) 10 MG tablet Take 1 tablet (10 mg) by mouth 3 times daily as needed for muscle spasms 30 tablet 0    estradiol (ESTRACE) 2 MG tablet Take 1 tablet (2 mg) by mouth daily 90 tablet 3    mefloquine (LARIAM) 250 MG tablet Take one tablet weekly - start 2 weeks prior to travel to malaria area, continue weekly through 4 weeks after leaving malaria area 15 tablet 0    naproxen (NAPROSYN) 500 MG tablet Take 1 tablet (500 mg) by mouth 2 times daily (with meals) 30 tablet 0    progesterone (PROMETRIUM) 200 MG capsule Take 1 capsule (200 mg) by mouth daily Take for the first 12 days of the month. 36 capsule 3    vitamin D3 (CHOLECALCIFEROL) 250 mcg (24946 units) capsule Take 1 capsule (250 mcg) by mouth once a week 12 capsule 4    vitamin D3 (CHOLECALCIFEROL) 50 mcg (2000 units) tablet Take 1 tablet (50 mcg) by mouth daily 100 tablet 4     No current facility-administered medications for this visit.       Allergy:  No Known Allergies  Patient denies food, latex or environmental allergies.     Physical Exam:  Vitals:    07/25/24 0814   BP: 105/78  "  Pulse: (!) 48   SpO2: 97%   Weight: 71.2 kg (157 lb)   Height: 1.499 m (4' 11\")     Estimated body mass index is 31.71 kg/m  as calculated from the following:    Height as of this encounter: 1.499 m (4' 11\").    Weight as of this encounter: 71.2 kg (157 lb).     Gen: healthy, alert, active, no distress        Assessment:  Caleb Talley is a 39 year old  with history of secondary amenorrhea due to premature ovarian insufficiency here for hormone check. Regular menses for past 2 months. Trying to conceive - going to see her  in [place] next month.    Plan:  1. Secondary oligomenorrhea  Discussed nature of premature ovarian insufficiency - will ovulate sometimes and possible to conceive but chances are lower.    - Follicle stimulating hormone; Future  - Estradiol; Future  - Anti-Mullerian hormone; Future  - TSH with free T4 reflex; Future  - Comprehensive metabolic panel (BMP + Alb, Alk Phos, ALT, AST, Total. Bili, TP); Future  - Hemoglobin A1c; Future  - Progesterone; Future  - Follicle stimulating hormone  - Estradiol  - Anti-Mullerian hormone  - TSH with free T4 reflex  - Comprehensive metabolic panel (BMP + Alb, Alk Phos, ALT, AST, Total. Bili, TP)  - Hemoglobin A1c  - Progesterone    2. Vitamin D deficiency    - Vitamin D Deficiency; Future  - Vitamin D Deficiency    3.  Encounter for preconception consultation  Discussed importance of folic acid, recommend starting PNV prior to conception  - Prenatal Vit-Fe Fumarate-FA (PRENATAL MULTIVITAMIN W/IRON) 27-0.8 MG tablet; Take 1 tablet by mouth daily  Dispense: 90 tablet; Refill: 3      Janna Jones MD        "

## 2024-07-31 ENCOUNTER — APPOINTMENT (OUTPATIENT)
Dept: INTERPRETER SERVICES | Facility: CLINIC | Age: 39
End: 2024-07-31
Payer: COMMERCIAL

## 2024-10-30 ENCOUNTER — PATIENT OUTREACH (OUTPATIENT)
Dept: CARE COORDINATION | Facility: CLINIC | Age: 39
End: 2024-10-30
Payer: COMMERCIAL

## 2024-11-13 ENCOUNTER — PATIENT OUTREACH (OUTPATIENT)
Dept: CARE COORDINATION | Facility: CLINIC | Age: 39
End: 2024-11-13
Payer: COMMERCIAL

## 2024-12-26 ENCOUNTER — APPOINTMENT (OUTPATIENT)
Dept: RADIOLOGY | Facility: HOSPITAL | Age: 39
End: 2024-12-26
Attending: EMERGENCY MEDICINE
Payer: COMMERCIAL

## 2024-12-26 ENCOUNTER — HOSPITAL ENCOUNTER (EMERGENCY)
Facility: HOSPITAL | Age: 39
Discharge: HOME | End: 2024-12-26
Attending: EMERGENCY MEDICINE | Admitting: EMERGENCY MEDICINE
Payer: COMMERCIAL

## 2024-12-26 VITALS
BODY MASS INDEX: 30.46 KG/M2 | RESPIRATION RATE: 18 BRPM | HEART RATE: 85 BPM | SYSTOLIC BLOOD PRESSURE: 117 MMHG | WEIGHT: 165.5 LBS | HEIGHT: 62 IN | TEMPERATURE: 97 F | OXYGEN SATURATION: 100 % | DIASTOLIC BLOOD PRESSURE: 84 MMHG

## 2024-12-26 DIAGNOSIS — S66.911A STRAIN OF RIGHT WRIST, INITIAL ENCOUNTER: ICD-10-CM

## 2024-12-26 DIAGNOSIS — H57.89 IRRITATION OF RIGHT EYE: ICD-10-CM

## 2024-12-26 PROCEDURE — 99284 EMERGENCY DEPT VISIT MOD MDM: CPT | Mod: 25

## 2024-12-26 PROCEDURE — 73110 X-RAY EXAM OF WRIST: CPT | Mod: RT

## 2024-12-26 PROCEDURE — 250N000009 HC RX 250: Performed by: EMERGENCY MEDICINE

## 2024-12-26 PROCEDURE — 29125 APPL SHORT ARM SPLINT STATIC: CPT | Mod: RT

## 2024-12-26 RX ORDER — TETRACAINE HYDROCHLORIDE 5 MG/ML
1-2 SOLUTION OPHTHALMIC ONCE
Status: COMPLETED | OUTPATIENT
Start: 2024-12-26 | End: 2024-12-26

## 2024-12-26 RX ADMIN — TETRACAINE HYDROCHLORIDE 2 DROP: 5 SOLUTION OPHTHALMIC at 21:07

## 2024-12-26 RX ADMIN — FLUORESCEIN SODIUM 1 STRIP: 1 STRIP OPHTHALMIC at 21:07

## 2024-12-26 ASSESSMENT — VISUAL ACUITY
OS: 20/70
OU: 20/70
OU: NORMAL
OD: 20/70

## 2024-12-26 ASSESSMENT — COLUMBIA-SUICIDE SEVERITY RATING SCALE - C-SSRS
1. IN THE PAST MONTH, HAVE YOU WISHED YOU WERE DEAD OR WISHED YOU COULD GO TO SLEEP AND NOT WAKE UP?: NO
6. HAVE YOU EVER DONE ANYTHING, STARTED TO DO ANYTHING, OR PREPARED TO DO ANYTHING TO END YOUR LIFE?: NO
2. HAVE YOU ACTUALLY HAD ANY THOUGHTS OF KILLING YOURSELF IN THE PAST MONTH?: NO

## 2024-12-26 ASSESSMENT — ACTIVITIES OF DAILY LIVING (ADL)
ADLS_ACUITY_SCORE: 42
ADLS_ACUITY_SCORE: 42

## 2024-12-27 NOTE — ED TRIAGE NOTES
Amb to triage.  Pt states splashed bleach in R eye about 1630 today when cleaning bathroom.  Went to urgent care and seen there.  While there also wanted R wrist to be seen because having pain in R wrist when trying to  son in the last couple days.  Reports hx of injury to that wrist but was healed and fine until recently.  Feels R wristbone is bigger than the L.  Urgent care sent pt to ED for eval .  Pt denies any changes in vision but painful and irritated.      Triage Assessment (Adult)       Row Name 12/26/24 1405          Triage Assessment    Airway WDL WDL        Respiratory WDL    Respiratory WDL WDL        Skin Circulation/Temperature WDL    Skin Circulation/Temperature WDL WDL        Cardiac WDL    Cardiac WDL WDL        Peripheral/Neurovascular WDL    Peripheral Neurovascular WDL WDL        Cognitive/Neuro/Behavioral WDL    Cognitive/Neuro/Behavioral WDL WDL

## 2024-12-27 NOTE — ED PROVIDER NOTES
EMERGENCY DEPARTMENT ENCOUNTER      NAME: Caleb Talley  AGE: 39 year old female  YOB: 1985  MRN: [id number]  EVALUATION DATE & TIME: 12/26/2024  7:22 PM    PCP: Rin Hoffmann    ED PROVIDER: Jayshree Monge M.D.      Chief Complaint   Patient presents with    Eye Problem    Wrist Pain     FINAL IMPRESSION:  1. Irritation of right eye    2. Strain of right wrist, initial encounter      ED COURSE & MEDICAL DECISION MAKING:    Pertinent Labs & Imaging studies reviewed. (See chart for details)  ED Course as of 12/26/24 2102   Thu Dec 26, 2024   2006 Patient is a pleasant 39-year-old female comes in today with 2 main complaints.  She is status with bleach in her right eye today between 430 and 5 PM when she was cleaning the bathroom.  She washed it out for about 10 minutes but is still little bit burning and painful.  She is not having as much tearing as she was earlier.  She feels like vision may be slightly abnormal but overall pretty good.  Will do visual acuities on her.  I talked to [facility] center and they recommended doing a pH and I did check the pH on her and it was a neutral pH.  Additionally patient has some right wrist pain.  Is located over the ulnar styloid and was related to when she slipped and fell a few weeks ago.  Now she has pain when she lifts her child.  She never had an x-ray so we will get an x-ray today.  If the x-ray looks okay we can put her in a splint to use as needed.  After workup of her eye and the wrist are completed she should be able to discharge home.   2059 I went back and stained the patient's eyes.  She has no corneal abrasion.  Her vision is equal side-to-side at 20/70.  X-ray came back unremarkable so I will order a wrist splint for the patient that can she can use as needed.  Will work on getting her discharged home.       Medical Decision Making    History:  Supplemental history from: None  External Record(s) reviewed: I reviewed the patient's urgent care visit  from 6/5/2024.  At that time she was being seen for evaluation of some neck and back pain thought to be related to cleaning her house.  She was having pain rating down her right arm to her shoulder.  They treated her with Flexeril and Naprosyn.    Work Up:  Emergent/Severe conditions considered and evaluated for: Wrist fracture, wrist sprain, corneal abrasion  I independently reviewed and interpreted x-ray of the wrist which showed no fracture. See full radiology report for all details.   In additional to work up documented, I considered the following work up: None  Medications given that require intensive monitoring for toxicity: None    External consultation:  Discussion of management with another provider: [facility]    Complicating factors:  Care impacted by chronic illness: None    Disposition considerations: Discharge  Prescriptions considered/prescribed: Considered eye ointment or eyedrops but patient had no corneal abrasion on fluorescein staining.        At the conclusion of the encounter I discussed  the results of all of the tests and the disposition with patient.   All questions were answered.  The patient acknowledged understanding and was involved in the decision making regarding the overall care plan.      I discussed with patient the utility, limitations and findings of the exam/interventions/studies done during this visit as well as the list of differential diagnosis and symptoms to monitor/return to ER for.  Additional verbal discharge instructions were provided.     MEDICATIONS GIVEN IN THE EMERGENCY:  Medications   fluorescein (FUL-MARILYNN) ophthalmic strip 1 strip (has no administration in time range)   tetracaine (PONTOCAINE) 0.5 % ophthalmic solution 1-2 drop (has no administration in time range)       NEW PRESCRIPTIONS STARTED AT TODAY'S ER VISIT  New Prescriptions    No medications on file     =================================================================    HPI    Triage Note: Amb to  triage.  Pt states splashed bleach in R eye about 1630 today when cleaning bathroom.  Went to urgent care and seen there.  While there also wanted R wrist to be seen because having pain in R wrist when trying to  son in the last couple days.  Reports hx of injury to that wrist but was healed and fine until recently.  Feels R wristbone is bigger than the L.  Urgent care sent pt to ED for eval .  Pt denies any changes in vision but painful and irritated.      Triage Assessment (Adult)       Row Name 12/26/24 6776          Triage Assessment    Airway WDL WDL        Respiratory WDL    Respiratory WDL WDL        Skin Circulation/Temperature WDL    Skin Circulation/Temperature WDL WDL        Cardiac WDL    Cardiac WDL WDL        Peripheral/Neurovascular WDL    Peripheral Neurovascular WDL WDL        Cognitive/Neuro/Behavioral WDL    Cognitive/Neuro/Behavioral WDL WDL                   Use of : Yes, Conner video       Caleb Talley is a 39 year old female who presents for evaluation after she splashed some bleach in her right eye around 430 or 5 PM as well as some right wrist pain has been going on for a few weeks after she fell and now has pain with lifting of her child.    PAST MEDICAL HISTORY:  Past Medical History:   Diagnosis Date    Anemia during pregnancy in first trimester     Blood type A+ 01/18/2019    Premature ovarian failure 01/2024    taking estrogen and progesterone    Rubella non-immune status, antepartum 01/18/2019       PAST SURGICAL HISTORY:  Past Surgical History:   Procedure Laterality Date    NO HISTORY OF SURGERY         CURRENT MEDICATIONS:      Current Facility-Administered Medications:     fluorescein (FUL-MARILYNN) ophthalmic strip 1 strip, 1 strip, Both Eyes, Once, Jayshree Monge MD    tetracaine (PONTOCAINE) 0.5 % ophthalmic solution 1-2 drop, 1-2 drop, Both Eyes, Once, Jayshree Monge MD    Current Outpatient Medications:     acetaminophen (TYLENOL)  500 MG tablet, Take 1-2 tablets (500-1,000 mg) by mouth every 6 hours as needed for mild pain, Disp: 50 tablet, Rfl: 0    azithromycin (ZITHROMAX) 500 MG tablet, Take one tablet daily for up to 3 days as needed for traveler's diarrhea. May repeat if needed., Disp: 9 tablet, Rfl: 0    cyclobenzaprine (FLEXERIL) 10 MG tablet, Take 1 tablet (10 mg) by mouth 3 times daily as needed for muscle spasms, Disp: 30 tablet, Rfl: 0    estradiol (ESTRACE) 2 MG tablet, Take 1 tablet (2 mg) by mouth daily, Disp: 90 tablet, Rfl: 3    mefloquine (LARIAM) 250 MG tablet, Take one tablet weekly - start 2 weeks prior to travel to malaria area, continue weekly through 4 weeks after leaving malaria area, Disp: 15 tablet, Rfl: 0    naproxen (NAPROSYN) 500 MG tablet, Take 1 tablet (500 mg) by mouth 2 times daily (with meals), Disp: 30 tablet, Rfl: 0    Prenatal Vit-Fe Fumarate-FA (PRENATAL MULTIVITAMIN W/IRON) 27-0.8 MG tablet, Take 1 tablet by mouth daily, Disp: 90 tablet, Rfl: 3    progesterone (PROMETRIUM) 200 MG capsule, Take 1 capsule (200 mg) by mouth daily Take for the first 12 days of the month., Disp: 36 capsule, Rfl: 3    vitamin D3 (CHOLECALCIFEROL) 250 mcg (33261 units) capsule, Take 1 capsule (250 mcg) by mouth once a week, Disp: 12 capsule, Rfl: 4    vitamin D3 (CHOLECALCIFEROL) 50 mcg (2000 units) tablet, Take 1 tablet (50 mcg) by mouth daily, Disp: 100 tablet, Rfl: 4    ALLERGIES:  No Known Allergies    FAMILY HISTORY:  No family history on file.    SOCIAL HISTORY:   Social History     Socioeconomic History    Marital status:      Spouse name: Marcela    Number of children: 0   Occupational History    Occupation: homemaker   Tobacco Use    Smoking status: Never    Smokeless tobacco: Never   Vaping Use    Vaping status: Never Used   Substance and Sexual Activity    Alcohol use: No    Drug use: No    Sexual activity: Yes     Partners: Male     Social Drivers of Health     Financial Resource Strain: Low Risk  (7/26/2022)  "   Overall Financial Resource Strain (CARDIA)     Difficulty of Paying Living Expenses: Not hard at all   Food Insecurity: No Food Insecurity (7/26/2022)    Hunger Vital Sign     Worried About Running Out of Food in the Last Year: Never true     Ran Out of Food in the Last Year: Never true   Transportation Needs: No Transportation Needs (7/26/2022)    PRAPARE - Transportation     Lack of Transportation (Medical): No     Lack of Transportation (Non-Medical): No   Physical Activity: Sufficiently Active (7/26/2022)    Exercise Vital Sign     Days of Exercise per Week: 6 days     Minutes of Exercise per Session: 50 min   Stress: No Stress Concern Present (7/26/2022)    [place] [employer] - Occupational Stress Questionnaire     Feeling of Stress : Not at all   Social Connections: Socially Isolated (7/26/2022)    Social Connection and Isolation Panel [NHANES]     Frequency of Communication with Friends and Family: Once a week     Frequency of Social Gatherings with Friends and Family: Once a week     Attends Pentec[employer]al Services: Never     Active Member of Clubs or Organizations: No     Marital Status:    Housing Stability: Low Risk  (7/26/2022)    Housing Stability Vital Sign     Unable to Pay for Housing in the Last Year: No     Number of Places Lived in the Last Year: 1     Unstable Housing in the Last Year: No       PHYSICAL EXAM    VITAL SIGNS: /84   Pulse 85   Temp 97  F (36.1  C) (Temporal)   Resp 18   Ht 1.575 m (5' 2\")   Wt 75.1 kg (165 lb 8 oz)   LMP 12/21/2024 (Approximate)   SpO2 100%   BMI 30.27 kg/m     GENERAL: Awake, alert, answering questions appropriately, no acute distress, no significant conjunctivitis bilaterally.  Pupils equally round and reactive to light.  No corneal uptake on fluorescein staining.  SPEECH:  Easy to understand speech, Normal volume and abraham  PULMONARY: No respiratory distress,   CARDIOVASCULAR: Regular rate and rhythm, Distal pulses " present and normal.  EXTREMITIES: No lower extremity edema.  Mild tenderness to the right ulnar styloid.  Good range of motion of the wrist without significant tenderness.  PSYCH: Normal mood and affect     RADIOLOGY:  XR Wrist Right G/E 3 Views   Final Result   IMPRESSION: Normal joint spaces and alignment. No fracture. If there is snuffbox tenderness and high clinical concern for occult scaphoid fracture, conservative management and followup radiographs in 7-10 days is suggested.           Jayshree Monge M.D.  Emergency Medicine  [facility] EMERGENCY DEPARTMENT  [address] [place] [id number]  [phone]  Dept: [phone]       Jayshree Monge MD  12/26/24 4022

## 2024-12-27 NOTE — DISCHARGE INSTRUCTIONS
You were seen in the Emergency Department today for evaluation of right wrist pain and irritation to your eye.  Your imaging studies showed no significant cause of your symptoms. Follow up with your primary care physician to ensure resolution of symptoms. Return if you have new or worsening symptoms.

## 2025-01-12 ENCOUNTER — HEALTH MAINTENANCE LETTER (OUTPATIENT)
Age: 40
End: 2025-01-12

## 2025-03-26 ENCOUNTER — OFFICE VISIT (OUTPATIENT)
Dept: URGENT CARE | Facility: URGENT CARE | Age: 40
End: 2025-03-26
Payer: COMMERCIAL

## 2025-03-26 VITALS
DIASTOLIC BLOOD PRESSURE: 74 MMHG | BODY MASS INDEX: 29.63 KG/M2 | HEART RATE: 78 BPM | SYSTOLIC BLOOD PRESSURE: 110 MMHG | TEMPERATURE: 98 F | RESPIRATION RATE: 20 BRPM | OXYGEN SATURATION: 98 % | WEIGHT: 162 LBS

## 2025-03-26 DIAGNOSIS — J02.9 SORE THROAT: Primary | ICD-10-CM

## 2025-03-26 LAB
DEPRECATED S PYO AG THROAT QL EIA: NEGATIVE
S PYO DNA THROAT QL NAA+PROBE: NOT DETECTED

## 2025-03-26 PROCEDURE — 87651 STREP A DNA AMP PROBE: CPT | Performed by: PHYSICIAN ASSISTANT

## 2025-03-26 PROCEDURE — 3078F DIAST BP <80 MM HG: CPT | Performed by: PHYSICIAN ASSISTANT

## 2025-03-26 PROCEDURE — 3074F SYST BP LT 130 MM HG: CPT | Performed by: PHYSICIAN ASSISTANT

## 2025-03-26 PROCEDURE — 99214 OFFICE O/P EST MOD 30 MIN: CPT | Performed by: PHYSICIAN ASSISTANT

## 2025-03-26 NOTE — PATIENT INSTRUCTIONS
Suggested increased rest increased fluids and bedside humidification  Over-the-counter Tylenol for comfort.  Follow packaging directions  Over-the-counter throat lozenges with benzocaine, such as Cepacol, may be used if indicated and is not a choking hazard based on age.    Follow packaging directions for throat lozenges.    Do not overuse the benzocaine as it will dry the throat and make it uncomfortable.  Use one lozenge per hour as directed on package and may use hard candies lemon drops etc. keep the saliva flowing until the next dosing interval after 1 hour.    Follow up with primary care provider if you do not get resolution with the course of treatment.  Return to walk-in care if complication or new symptoms arise in the interim.

## 2025-03-26 NOTE — PROGRESS NOTES
Patient presents with:  Pharyngitis: Sore throat and neck hurts X3 days       Clinical Decision Making:  Strep test was obtained and was negative.  Culture is to follow.  Symptomatic care was gone over. Expected course of resolution and indication for return was gone over and questions were answered to patient/parent's satisfaction before discharge.    30 min spent on the date of the encounter in chart review, patient visit, review of tests, documentation and/ordiscussion with other providers about the issues documented above. Extra time was spent with [place]  on the phone to go over physical and treatment plan and answer questions.        ICD-10-CM    1. Sore throat  J02.9 Streptococcus A Rapid Screen w/Reflex to PCR     Group A Streptococcus PCR Throat Swab     benzocaine-menthol (CHLORASEPTIC) 6-10 MG lozenge          Patient Instructions   Suggested increased rest increased fluids and bedside humidification  Over-the-counter Tylenol for comfort.  Follow packaging directions  Over-the-counter throat lozenges with benzocaine, such as Cepacol, may be used if indicated and is not a choking hazard based on age.    Follow packaging directions for throat lozenges.    Do not overuse the benzocaine as it will dry the throat and make it uncomfortable.  Use one lozenge per hour as directed on package and may use hard candies lemon drops etc. keep the saliva flowing until the next dosing interval after 1 hour.    Follow up with primary care provider if you do not get resolution with the course of treatment.  Return to walk-in care if complication or new symptoms arise in the interim.     HPI:  Caleb Talley is a 40 year old female who presents today for five day onset of sore throat and odynophagia and left ear pain.  Patient denies fever, chills, night sweats, fatigue, vomiting, diarrhea, skin rash, abdominal pain or urinary symptoms.      No known sick contacts for strep throat.    Has not tried treatment for  this over-the-counter.     History obtained from chart review, the patient, and phone .    Problem List:  2024-01: Irregular menstrual bleeding  2024-01: Premature ovarian insufficiency  2023-12: ASCUS of cervix with negative high risk HPV  2022-04: Morbid obesity (H)  2019-05: Indication for care in labor or delivery  2019-04: Prenatal care in third trimester  2019-01: Blood type A+  2019-01: Rubella non-immune status, antepartum  2019-01: Elevated glucose level  2019-01: Anemia during pregnancy in first trimester      Past Medical History:   Diagnosis Date    Anemia during pregnancy in first trimester     Blood type A+ 01/18/2019    Premature ovarian failure 01/2024    taking estrogen and progesterone    Rubella non-immune status, antepartum 01/18/2019       Social History     Tobacco Use    Smoking status: Never    Smokeless tobacco: Never   Substance Use Topics    Alcohol use: No       Review of Systems  As above in HPI otherwise negative.    Vitals:    03/26/25 1011   BP: 110/74   Pulse: 78   Resp: 20   Temp: 98  F (36.7  C)   SpO2: 98%   Weight: 73.5 kg (162 lb)       General: Patient is resting comfortably no acute distress is afebrile  HEENT: Head is normocephalic atraumatic   eyes are PERRL EOMI sclera anicteric   TMs are clear bilaterally  Throat is with mild pharyngeal wall erythema and no exudate  No cervical lymphadenopathy present  LUNGS: Clear to auscultation bilaterally  HEART: Regular rate and rhythm  Skin: Without rash non-diaphoretic    Physical Exam      Labs:  Results for orders placed or performed in visit on 03/26/25   Streptococcus A Rapid Screen w/Reflex to PCR     Status: Normal    Specimen: Throat; Swab   Result Value Ref Range    Group A Strep antigen Negative Negative     At the end of the encounter, I discussed results, diagnosis, medications. Discussed red flags for immediate return to clinic/ER, as well as indications for follow up if no improvement. Patient understood and  agreed to plan. Patient was stable for discharge.

## 2025-05-28 ENCOUNTER — PATIENT OUTREACH (OUTPATIENT)
Dept: CARE COORDINATION | Facility: CLINIC | Age: 40
End: 2025-05-28
Payer: COMMERCIAL

## 2025-05-29 ENCOUNTER — OFFICE VISIT (OUTPATIENT)
Dept: OBGYN | Facility: CLINIC | Age: 40
End: 2025-05-29
Payer: COMMERCIAL

## 2025-05-29 VITALS
BODY MASS INDEX: 31.83 KG/M2 | HEIGHT: 62 IN | WEIGHT: 173 LBS | DIASTOLIC BLOOD PRESSURE: 64 MMHG | SYSTOLIC BLOOD PRESSURE: 112 MMHG

## 2025-05-29 DIAGNOSIS — N92.6 IRREGULAR MENSTRUAL BLEEDING: ICD-10-CM

## 2025-05-29 DIAGNOSIS — E28.39 PREMATURE OVARIAN INSUFFICIENCY: Primary | ICD-10-CM

## 2025-05-29 PROCEDURE — 82670 ASSAY OF TOTAL ESTRADIOL: CPT | Performed by: STUDENT IN AN ORGANIZED HEALTH CARE EDUCATION/TRAINING PROGRAM

## 2025-05-29 PROCEDURE — 99214 OFFICE O/P EST MOD 30 MIN: CPT | Performed by: STUDENT IN AN ORGANIZED HEALTH CARE EDUCATION/TRAINING PROGRAM

## 2025-05-29 PROCEDURE — 3074F SYST BP LT 130 MM HG: CPT | Performed by: STUDENT IN AN ORGANIZED HEALTH CARE EDUCATION/TRAINING PROGRAM

## 2025-05-29 PROCEDURE — 3078F DIAST BP <80 MM HG: CPT | Performed by: STUDENT IN AN ORGANIZED HEALTH CARE EDUCATION/TRAINING PROGRAM

## 2025-05-29 PROCEDURE — 36415 COLL VENOUS BLD VENIPUNCTURE: CPT | Performed by: STUDENT IN AN ORGANIZED HEALTH CARE EDUCATION/TRAINING PROGRAM

## 2025-05-29 PROCEDURE — 83001 ASSAY OF GONADOTROPIN (FSH): CPT | Performed by: STUDENT IN AN ORGANIZED HEALTH CARE EDUCATION/TRAINING PROGRAM

## 2025-05-29 NOTE — PROGRESS NOTES
SUBJECTIVE:                                                   Caleb Talley is a 40 year old female who presents to clinic today for the following health issue(s):  Patient presents with:  Infertililty: Patient has been trying for pregnancy for 6 years, since the birth of her last child.    HPI:  Caleb presents today to discuss options for fertility and attempting pregnancy. She has previously seen gynecology and was evaluated and diagnosed with primary ovarian insufficiency in 2023 with follow up appointments in . Her initial presentation was for oligomenorrhea and subsequent testing showed elevated FSH and low AMH levels, suggesting POI. She previously was using hormone replacement but then stopped last year in May, and since then she reports getting monthly cycles. Bleeding consistently every month but last month had bleeding 23 days apart instead of usual 28-30. She has been attempting pregnancy for the past several years and this is also complicated by her  still living in [place]. She does visit when she can but the timing can be inconsistent with her cycles as well. She has been tracking her cycles but has not tried ovulation kits. She goes to see her  again in  and would like to know her options.    2023 pelvic US: UTERUS: 7.2 x 4.2 x 4.5 cm. Normal in size and position with no masses.   ENDOMETRIUM: 8 mm. Normal smooth endometrium.   RIGHT OVARY: 3.1 x 2.0 x 3.0 cm. Normal, with 2 dominant follicles measuring 1.4 cm and 1.1 cm, respectively.   LEFT OVARY: 1.8 x 1.0 x 1.0 cm. Normal.    2023 -> 2024 Estradiol: 79-66-78-72. FSH: 25-28.3-20.4-70.7. AMH: 0.1-<0.03-<0.03.  Testosterone levels normal. Progesterone levels low. TSH 3.3 most recently.    Patient's last menstrual period was 2025..   Patient is sexually active, .  Using nothing for contraception, trying to conceive.    reports that she has never smoked. She has never used smokeless tobacco.  STD  testing offered?  Declined  Health maintenance reviewed.     Problem list and histories reviewed & adjusted, as indicated.  Additional history: as documented.    Patient Active Problem List   Diagnosis    Blood type A+    Elevated glucose level    Morbid obesity (H)    ASCUS of cervix with negative high risk HPV    Irregular menstrual bleeding    Premature ovarian insufficiency     Past Surgical History:   Procedure Laterality Date    NO HISTORY OF SURGERY        Social History     Tobacco Use    Smoking status: Never    Smokeless tobacco: Never   Substance Use Topics    Alcohol use: No      Problem (# of Occurrences) Relation (Name,Age of Onset)    No Known Problems (2) Mother, Father              Current Outpatient Medications   Medication Sig Dispense Refill    acetaminophen (TYLENOL) 500 MG tablet Take 1-2 tablets (500-1,000 mg) by mouth every 6 hours as needed for mild pain 50 tablet 0    Collagen-Vitamin C-Biotin (COLLAGEN PO) Take by mouth.      cyclobenzaprine (FLEXERIL) 10 MG tablet Take 1 tablet (10 mg) by mouth 3 times daily as needed for muscle spasms 30 tablet 0    MAGNESIUM PO Take by mouth.      mefloquine (LARIAM) 250 MG tablet Take one tablet weekly - start 2 weeks prior to travel to malaria area, continue weekly through 4 weeks after leaving malaria area 15 tablet 0    naproxen (NAPROSYN) 500 MG tablet Take 1 tablet (500 mg) by mouth 2 times daily (with meals) 30 tablet 0    Omega-3 Fatty Acids (OMEGA 3 PO) Take by mouth.      vitamin D3 (CHOLECALCIFEROL) 250 mcg (73028 units) capsule Take 1 capsule (250 mcg) by mouth once a week 12 capsule 4    vitamin D3 (CHOLECALCIFEROL) 50 mcg (2000 units) tablet Take 1 tablet (50 mcg) by mouth daily 100 tablet 4    estradiol (ESTRACE) 2 MG tablet Take 1 tablet (2 mg) by mouth daily (Patient not taking: Reported on 5/29/2025) 90 tablet 3    Prenatal Vit-Fe Fumarate-FA (PRENATAL MULTIVITAMIN W/IRON) 27-0.8 MG tablet Take 1 tablet by mouth daily (Patient not  "taking: Reported on 5/29/2025) 90 tablet 3    progesterone (PROMETRIUM) 200 MG capsule Take 1 capsule (200 mg) by mouth daily Take for the first 12 days of the month. (Patient not taking: Reported on 5/29/2025) 36 capsule 3     No current facility-administered medications for this visit.     No Known Allergies    ROS:    12 point review of systems negative other than symptoms noted below or in the HPI.    OBJECTIVE:     /64   Ht 1.575 m (5' 2\")   Wt 78.5 kg (173 lb)   LMP 05/24/2025   BMI 31.64 kg/m    Body mass index is 31.64 kg/m .    Exam:  Constitutional:  Appearance: Well nourished, well developed alert, in no acute distress  Chest:  Respiratory Effort:  Breathing unlabored. Clear to auscultation bilaterally.   Neurologic:  Mental Status:  Oriented X3.  Mentation intact and speech normal.    Psychiatric:  Mentation appears normal and affect normal/bright.     In-Clinic Test Results:  No results found for this or any previous visit (from the past 24 hours).    ASSESSMENT/PLAN:                                                        ICD-10-CM    1. Premature ovarian insufficiency  E28.39 Follicle stimulating hormone     Estradiol     Follicle stimulating hormone     Estradiol      2. Irregular menstrual bleeding  N92.6 Follicle stimulating hormone     Estradiol     Follicle stimulating hormone     Estradiol          There are no Patient Instructions on file for this visit.    POI  - long discussion on nature of POI, occasionally can have return of ovulation, and difficulties with conceiving for pregnancy. She would like to recheck her hormone levels today given her regular cycles.  - we reviewed regular menstrual cycles, hormonal influences that regular cycles, timing for expected ovulation, timing for intercourse, and options for ovulation predictor kits to help her track her cycles  - we also extensively discussed impact of her age on fertility and at 40 years old, she would have more difficulties " conceiving even without POI diagnosis. We reviewed increased risks of miscarriage, genetic abnormalities, and increased risks of pregnancy if she were to conceive.  - we discussed option of [facility] referral as she is strongly interested in options to assist her in attempting pregnancy. Today we reviewed considerations for cycle management, ovulation induction, IUI, and IVF. She would not likely be a good candidate for IVF given her low AMH and age. She is not considering donor eggs as an option either. Logistics would be difficult also as her  is not here with [place] in the [place].  - while it would be possible still for her to spontaneously conceive, the chances would be very low  - she is still interested in discussing options and considerations with [facility] given her hx of POI. Information given for both RMIA and CCRM for her to schedule  - for now, she will try ovulation kits and timed intercourse based on her cycle tracking. Will attempt [facility]EL scheduling when she returns to the country. Will follow up FSH and E2      Qualified  was utilized for the duration of this visit. ID# [id number]    Marivel Harper MD  [facility] [place]

## 2025-05-30 LAB
ESTRADIOL SERPL-MCNC: 91 PG/ML
FSH SERPL IRP2-ACNC: 30.5 MIU/ML

## 2025-06-04 ENCOUNTER — RESULTS FOLLOW-UP (OUTPATIENT)
Dept: OBGYN | Facility: CLINIC | Age: 40
End: 2025-06-04

## 2025-06-19 ENCOUNTER — VIRTUAL VISIT (OUTPATIENT)
Dept: INTERPRETER SERVICES | Facility: CLINIC | Age: 40
End: 2025-06-19

## 2025-06-19 ENCOUNTER — OFFICE VISIT (OUTPATIENT)
Dept: FAMILY MEDICINE | Facility: CLINIC | Age: 40
End: 2025-06-19
Payer: COMMERCIAL

## 2025-06-19 VITALS
WEIGHT: 172 LBS | HEIGHT: 62 IN | RESPIRATION RATE: 20 BRPM | OXYGEN SATURATION: 98 % | HEART RATE: 78 BPM | DIASTOLIC BLOOD PRESSURE: 84 MMHG | SYSTOLIC BLOOD PRESSURE: 126 MMHG | TEMPERATURE: 98.2 F | BODY MASS INDEX: 31.65 KG/M2

## 2025-06-19 DIAGNOSIS — E28.39 PREMATURE OVARIAN INSUFFICIENCY: Primary | ICD-10-CM

## 2025-06-19 DIAGNOSIS — Z31.69 ENCOUNTER FOR PRECONCEPTION CONSULTATION: ICD-10-CM

## 2025-06-19 DIAGNOSIS — N97.9 FEMALE INFERTILITY: ICD-10-CM

## 2025-06-19 DIAGNOSIS — Z12.31 VISIT FOR SCREENING MAMMOGRAM: ICD-10-CM

## 2025-06-19 RX ORDER — OMEGA-3 FATTY ACIDS/FISH OIL 300-1000MG
1 CAPSULE ORAL DAILY
Qty: 180 CAPSULE | Refills: 1 | Status: SHIPPED | OUTPATIENT
Start: 2025-06-19

## 2025-06-19 RX ORDER — PRENATAL VIT/IRON FUM/FOLIC AC 27MG-0.8MG
1 TABLET ORAL DAILY
Qty: 180 TABLET | Refills: 1 | Status: SHIPPED | OUTPATIENT
Start: 2025-06-19

## 2025-06-19 NOTE — PROGRESS NOTES
"  Assessment & Plan     Encounter for preconception consultation  Prenatal refilled for patient. She also asked that her fish oil be refilled.  - Prenatal Vit-Fe Fumarate-FA (PRENATAL MULTIVITAMIN W/IRON) 27-0.8 MG tablet; Take 1 tablet by mouth daily.  - omega 3 1000 MG CAPS; Take 1 tablet by mouth daily.    Premature ovarian insufficiency  I will reach out to the OB/GYN team that saw her to see if letrozole is an option for her to conceive. It appears fertility clinic was what they had recommended.    Female infertility  As noted above.    Visit for screening mammogram  Due. Ordered for patient today.  - MA Screening Bilateral w/ Karri; Future    Subjective   Caleb is a 40 year old, presenting for the following health issues:  Prenatal Care        6/19/2025     4:00 PM   Additional Questions   Roomed by Sandra CARDONA    used for entire visit    History of Present Illness       Reason for visit:  Wants to segundo pregnant   She is taking medications regularly.         states patient will be going [place] of [place] to see  to get pregnant now that her periods are regular.  states patient would like to get pregnant but stopped taking prenatal vitamins. Interperter states patient requires vitamins and hormones for her time out of country so that she may get pregnant ASAP.     Patient did recently see Dr. Marivel Harper of OB/GYN. She recommended the patient follow up with infertility clinic but patient states she did not have the 500 dollars to do the appointment. She is interested in a medication called letrozole. She is wondering if this is an option.         Objective    /84 (BP Location: Right arm, Patient Position: Sitting, Cuff Size: Adult Regular)   Pulse 78   Temp 98.2  F (36.8  C) (Oral)   Resp 20   Ht 1.575 m (5' 2\")   Wt 78 kg (172 lb)   LMP 05/24/2025 (Exact Date)   SpO2 98%   BMI 31.46 kg/m    Body mass index is 31.46 kg/m .  Physical Exam "   GENERAL: No acute distress  HEENT: Normocephalic  NEURO: Alert and non-focal          Signed Electronically by: Etelvina Mathew PA-C

## (undated) RX ORDER — TETRACAINE HYDROCHLORIDE 5 MG/ML
SOLUTION OPHTHALMIC
Status: DISPENSED
Start: 2019-12-19